# Patient Record
Sex: FEMALE | Race: WHITE | NOT HISPANIC OR LATINO | Employment: STUDENT | URBAN - METROPOLITAN AREA
[De-identification: names, ages, dates, MRNs, and addresses within clinical notes are randomized per-mention and may not be internally consistent; named-entity substitution may affect disease eponyms.]

---
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
## 2024-02-15 ENCOUNTER — HOSPITAL ENCOUNTER (EMERGENCY)
Facility: HOSPITAL | Age: 19
Discharge: HOME | End: 2024-02-15
Attending: EMERGENCY MEDICINE
Payer: COMMERCIAL

## 2024-02-15 VITALS
WEIGHT: 225 LBS | HEART RATE: 83 BPM | SYSTOLIC BLOOD PRESSURE: 121 MMHG | RESPIRATION RATE: 16 BRPM | HEIGHT: 64 IN | DIASTOLIC BLOOD PRESSURE: 87 MMHG | OXYGEN SATURATION: 98 % | BODY MASS INDEX: 38.41 KG/M2 | TEMPERATURE: 98 F

## 2024-02-15 DIAGNOSIS — S61.012A LACERATION OF LEFT THUMB WITHOUT FOREIGN BODY WITHOUT DAMAGE TO NAIL, INITIAL ENCOUNTER: Primary | ICD-10-CM

## 2024-02-15 PROCEDURE — 99282 EMERGENCY DEPT VISIT SF MDM: CPT

## 2024-02-15 PROCEDURE — 90715 TDAP VACCINE 7 YRS/> IM: CPT | Performed by: PHYSICIAN ASSISTANT

## 2024-02-15 PROCEDURE — 25010000002 TETANUS-DIPHTH-ACELL PERTUSSIS 5-2.5-18.5 LF-MCG/0.5 SUSPENSION PREFILLED SYRINGE: Performed by: PHYSICIAN ASSISTANT

## 2024-02-15 PROCEDURE — 90471 IMMUNIZATION ADMIN: CPT | Performed by: PHYSICIAN ASSISTANT

## 2024-02-15 RX ADMIN — TETANUS TOXOID, REDUCED DIPHTHERIA TOXOID AND ACELLULAR PERTUSSIS VACCINE, ADSORBED 0.5 ML: 5; 2.5; 8; 8; 2.5 SUSPENSION INTRAMUSCULAR at 12:58

## 2024-02-15 NOTE — ED PROVIDER NOTES
Subjective   History of Present Illness  Pt is a 17 yo female presenting to ED with complaints of finger laceration. PMHx significant for Anxiety.  Pt reports accidentally lacerating left thumb with a knife at work just [employer]. Small flap laceration that does not involve the nail. Bleeding controlled. No other injuries. No numbness or weakness. No decrease in ROM. No other complaints. Unsure last Tdap. Denies tobacco, drug or ETOH use.    History provided by:  Patient, medical records and relative      Review of Systems   Skin:  Positive for wound.   Neurological:  Negative for weakness and numbness.       Past Medical History:   Diagnosis Date    Allergic rhinitis due to cats     Anxiety        Allergies   Allergen Reactions    Delsym [Dextromethorphan] Rash    Lexapro [Escitalopram] Rash       Past Surgical History:   Procedure Laterality Date    CYST REMOVAL Left     left arm/wrist       No family history on file.    Social History     Socioeconomic History    Marital status: Single   Tobacco Use    Smoking status: Never     Passive exposure: Yes    Smokeless tobacco: Never   Vaping Use    Vaping Use: Never used   Substance and Sexual Activity    Alcohol use: Yes    Drug use: Never    Sexual activity: Defer           Objective   Physical Exam  Vitals and nursing note reviewed.   Constitutional:       General: She is not in acute distress.  HENT:      Head: Atraumatic.   Eyes:      Extraocular Movements: Extraocular movements intact.      Conjunctiva/sclera: Conjunctivae normal.   Cardiovascular:      Rate and Rhythm: Normal rate.      Pulses: Normal pulses.   Pulmonary:      Effort: Pulmonary effort is normal. No respiratory distress.   Musculoskeletal:         General: Normal range of motion.      Cervical back: Normal range of motion and neck supple.   Skin:     General: Skin is warm.      Findings: Laceration (left thumb, tip next to nail, 1 cm flap still in place, bleeding controlled, non gapping) present.    Neurological:      General: No focal deficit present.      Mental Status: She is alert.   Psychiatric:         Mood and Affect: Mood normal.         Behavior: Behavior normal.         Laceration Repair    Date/Time: 2/15/2024 1:07 PM    Performed by: Maite Keita PA  Authorized by: Francesco Pastrana MD    Consent:     Consent obtained:  Verbal    Consent given by:  Patient    Risks, benefits, and alternatives were discussed: yes      Risks discussed:  Infection, pain, tendon damage, vascular damage, poor cosmetic result and poor wound healing    Alternatives discussed:  No treatment  Universal protocol:     Patient identity confirmed:  Verbally with patient  Anesthesia:     Anesthesia method:  None  Laceration details:     Location:  Finger    Finger location:  L thumb    Length (cm):  1  Pre-procedure details:     Preparation:  Patient was prepped and draped in usual sterile fashion  Exploration:     Contaminated: no    Treatment:     Area cleansed with:  Saline and chlorhexidine    Amount of cleaning:  Standard  Skin repair:     Repair method:  Steri-Strips and tissue adhesive    Number of Steri-Strips:  2  Approximation:     Approximation:  Close  Repair type:     Repair type:  Simple  Post-procedure details:     Dressing:  Non-adherent dressing    Procedure completion:  Tolerated well, no immediate complications             ED Course      No results found for this or any previous visit (from the past 24 hour(s)).  Note: In addition to lab results from this visit, the labs listed above may include labs taken at another facility or during a different encounter within the last 24 hours. Please correlate lab times with ED admission and discharge times for further clarification of the services performed during this visit.    No orders to display     Vitals:    02/15/24 1231   BP: 121/87   BP Location: Left arm   Patient Position: Sitting   Pulse: 83   Resp: 16   Temp: 98 °F (36.7 °C)   TempSrc: Oral   SpO2:  "98%   Weight: 102 kg (225 lb)   Height: 162.6 cm (64\")     Medications   Tetanus-Diphth-Acell Pertussis (BOOSTRIX) injection 0.5 mL (0.5 mL Intramuscular Given 2/15/24 1258)     ECG/EMG Results (last 24 hours)       ** No results found for the last 24 hours. **          No orders to display       DISCHARGE    Patient discharged in stable condition.    Reviewed implications of results, diagnosis, meds, responsibility to follow up, warning signs and symptoms of possible worsening, potential complications and reasons to return to ER.    Patient/Family voiced understanding of above instructions.    Discussed plan for discharge, as there is no emergent indication for admission.  Pt/family is agreeable and understands need for follow up and possible repeat testing.  Pt/family is aware that discharge does not mean that nothing is wrong but that it indicates no emergency is currently present that requires admission and they must continue care with follow-up as given below or with a physician of their choice.     FOLLOW-UP  Danny Rutledge DO  [address] [address]  [phone]      As needed    [facility] EMERGENCY DEPARTMENT  [address] [facility] [id number] [phone]    If symptoms worsen         Medication List      No changes were made to your prescriptions during this visit.                                              Medical Decision Making  Pt is a 19 yo female presenting to ED with complaints of left thumb laceration. No need for further repair other than tissue glue and steri strips. Cleaned wound. Tdap given. Went over would care with patient.     DDx  Laceration, Foreign body, Tendon injury, Fracture, Arterial bleed, Skin avulsion     Problems Addressed:  Laceration of left thumb without foreign body without damage to nail, initial encounter: acute illness or injury    Amount and/or Complexity of Data Reviewed  External Data Reviewed: notes.     " Details: Reviewed previous non ED visits including prior labs, imaging, available notes, medications, allergies and surgical hx.           Final diagnoses:   Laceration of left thumb without foreign body without damage to nail, initial encounter       ED Disposition  ED Disposition       ED Disposition   Discharge    Condition   Stable    Comment   --               Danny Rutledge DO  [address] [facility] [address]  [phone]      As needed    [facility] EMERGENCY DEPARTMENT  [address] [facility] [address] [phone]    If symptoms worsen         Medication List      No changes were made to your prescriptions during this visit.            Maite Keita PA  02/15/24 8077

## 2024-02-15 NOTE — Clinical Note
[facility] EMERGENCY DEPARTMENT  [address]  [facility] [address]  Phone: [phone]    Gorge Canela was seen and treated in our emergency department on 2/15/2024.  She may return to work on 02/16/2024.         Thank you for choosing [facility].    Chelsea Baig RN

## 2024-02-21 RX ORDER — MONTELUKAST SODIUM 10 MG/1
10 TABLET ORAL NIGHTLY
Qty: 90 TABLET | Refills: 0 | Status: SHIPPED | OUTPATIENT
Start: 2024-02-21

## 2024-03-19 ENCOUNTER — TELEMEDICINE (OUTPATIENT)
Dept: FAMILY MEDICINE CLINIC | Facility: TELEHEALTH | Age: 19
End: 2024-03-19
Payer: COMMERCIAL

## 2024-03-19 DIAGNOSIS — L02.429 BOIL, LEG: Primary | ICD-10-CM

## 2024-03-19 RX ORDER — SULFAMETHOXAZOLE AND TRIMETHOPRIM 800; 160 MG/1; MG/1
1 TABLET ORAL 2 TIMES DAILY
Qty: 20 TABLET | Refills: 0 | Status: SHIPPED | OUTPATIENT
Start: 2024-03-19 | End: 2024-03-29

## 2024-03-19 NOTE — LETTER
[facility]  [facility]  [address]  [address]  [facility] [address]  Phone: [phone]  Fax: [phone]    Gorge Canela was seen and treated in our Urgent Care on 3/19/2024.  She may return to work on 3/21/24.       .        Thank you for choosing [facility].      KRYTSAL Arauz

## 2024-03-19 NOTE — PROGRESS NOTES
You have chosen to receive care through a telehealth visit.  Do you consent to use a video/audio connection for your medical care today? Yes     CHIEF COMPLAINT  Chief Complaint   Patient presents with    skin infection         HPI  Gorge Canela is a 18 y.o. female  presents with complaint of a knot to bilateral inner thighs that appeared yesterday.  She states that it is painful to walk    Review of Systems   Skin:         Boils to bilateral inner thighs   All other systems reviewed and are negative.      Past Medical History:   Diagnosis Date    Allergic rhinitis due to cats     Anxiety        History reviewed. No pertinent family history.    Social History     Socioeconomic History    Marital status: Single   Tobacco Use    Smoking status: Never     Passive exposure: Yes    Smokeless tobacco: Never   Vaping Use    Vaping status: Never Used   Substance and Sexual Activity    Alcohol use: Yes    Drug use: Never    Sexual activity: Defer       Gorge Canela  reports that she has never smoked. She has been exposed to tobacco smoke. She has never used smokeless tobacco.           There were no vitals taken for this visit.    PHYSICAL EXAM  Physical Exam   Constitutional: She appears well-developed and well-nourished.   HENT:   Head: Normocephalic.   Eyes: Pupils are equal, round, and reactive to light.   Pulmonary/Chest: Effort normal.   Musculoskeletal: Normal range of motion.   Neurological: She is alert.   Skin: There is erythema (boils to bilateral inner thighs).   Psychiatric: She has a normal mood and affect.       Results for orders placed or performed during the hospital encounter of 11/20/23   Comprehensive Metabolic Panel    Specimen: Blood   Result Value Ref Range    Glucose 85 65 - 99 mg/dL    BUN 6 6 - 20 mg/dL    Creatinine 0.61 0.57 - 1.00 mg/dL    Sodium 140 136 - 145 mmol/L    Potassium 4.4 3.5 - 5.2 mmol/L    Chloride 104 98 - 107 mmol/L    CO2 25.0 22.0 - 29.0 mmol/L    Calcium 9.7 8.6 - 10.5  mg/dL    Total Protein 7.1 6.0 - 8.5 g/dL    Albumin 4.4 3.5 - 5.2 g/dL    ALT (SGPT) 13 1 - 33 U/L    AST (SGOT) 15 1 - 32 U/L    Alkaline Phosphatase 149 (H) 39 - 117 U/L    Total Bilirubin 0.5 0.0 - 1.2 mg/dL    Globulin 2.7 gm/dL    A/G Ratio 1.6 g/dL    BUN/Creatinine Ratio 9.8 7.0 - 25.0    Anion Gap 11.0 5.0 - 15.0 mmol/L    eGFR 132.3 >60.0 mL/min/1.73   Lipase    Specimen: Blood   Result Value Ref Range    Lipase 21 13 - 60 U/L   Urinalysis With Microscopic If Indicated (No Culture) - Urine, Clean Catch    Specimen: Urine, Clean Catch   Result Value Ref Range    Color, UA Yellow Yellow, Straw    Appearance, UA Clear Clear    pH, UA 7.0 5.0 - 8.0    Specific Gravity, UA 1.014 1.001 - 1.030    Glucose, UA Negative Negative    Ketones, UA Negative Negative    Bilirubin, UA Negative Negative    Blood, UA Negative Negative    Protein, UA Negative Negative    Leuk Esterase, UA Negative Negative    Nitrite, UA Negative Negative    Urobilinogen, UA 0.2 E.U./dL 0.2 - 1.0 E.U./dL   CBC Auto Differential    Specimen: Blood   Result Value Ref Range    WBC 9.17 3.40 - 10.80 10*3/mm3    RBC 4.80 3.77 - 5.28 10*6/mm3    Hemoglobin 13.7 12.0 - 15.9 g/dL    Hematocrit 42.0 34.0 - 46.6 %    MCV 87.5 79.0 - 97.0 fL    MCH 28.5 26.6 - 33.0 pg    MCHC 32.6 31.5 - 35.7 g/dL    RDW 12.6 12.3 - 15.4 %    RDW-SD 40.5 37.0 - 54.0 fl    MPV 11.2 6.0 - 12.0 fL    Platelets 383 140 - 450 10*3/mm3    Neutrophil % 62.9 42.7 - 76.0 %    Lymphocyte % 26.7 19.6 - 45.3 %    Monocyte % 7.4 5.0 - 12.0 %    Eosinophil % 2.1 0.3 - 6.2 %    Basophil % 0.5 0.0 - 1.5 %    Immature Grans % 0.4 0.0 - 0.5 %    Neutrophils, Absolute 5.76 1.70 - 7.00 10*3/mm3    Lymphocytes, Absolute 2.45 0.70 - 3.10 10*3/mm3    Monocytes, Absolute 0.68 0.10 - 0.90 10*3/mm3    Eosinophils, Absolute 0.19 0.00 - 0.40 10*3/mm3    Basophils, Absolute 0.05 0.00 - 0.20 10*3/mm3    Immature Grans, Absolute 0.04 0.00 - 0.05 10*3/mm3    nRBC 0.0 0.0 - 0.2 /100 WBC   POC Urine  Pregnancy    Specimen: Urine   Result Value Ref Range    HCG, Urine, QL Negative Negative    Lot Number [id number]     Internal Positive Control Passed Positive, Passed    Internal Negative Control Passed Negative, Passed    Expiration Date 01/30/2025    Lavender Top   Result Value Ref Range    Extra Tube hold for add-on    Gold Top - SST   Result Value Ref Range    Extra Tube Hold for add-ons.    Gray Top   Result Value Ref Range    Extra Tube Hold for add-ons.    Light Blue Top   Result Value Ref Range    Extra Tube Hold for add-ons.        Diagnoses and all orders for this visit:    1. Boil, leg (Primary)  -     sulfamethoxazole-trimethoprim (Bactrim DS) 800-160 MG per tablet; Take 1 tablet by mouth 2 (Two) Times a Day for 10 days.  Dispense: 20 tablet; Refill: 0          FOLLOW-UP  As discussed during visit with PCP/[facility] if no improvement or Urgent Care/Emergency Department if worsening of symptoms    Patient verbalizes understanding of medication dosage, comfort measures, instructions for treatment and follow-up.    Sandra Lopez, APRN  03/19/2024  07:10 EDT    The use of a video visit has been reviewed with the patient and verbal informed consent has been obtained. Myself and Gorge Canela participated in this visit. The patient is located in [address] [place] [address].    I am located in [place], [place]. [employer] and [employer] were utilized. I spent 10 minutes in the patient's chart for this visit.      Note Disclaimer: At [facility], we believe that sharing information builds trust and better   relationships. You are receiving this note because you recently visited [facility]. It is possible you   will see health information before a provider has talked with you about it. This kind of information can   be easy to misunderstand. To help you fully understand what it means for your health, we urge you to   discuss this note with your provider.

## 2024-03-21 ENCOUNTER — LAB (OUTPATIENT)
Dept: LAB | Facility: HOSPITAL | Age: 19
End: 2024-03-21
Payer: COMMERCIAL

## 2024-03-21 ENCOUNTER — OFFICE VISIT (OUTPATIENT)
Dept: FAMILY MEDICINE CLINIC | Facility: CLINIC | Age: 19
End: 2024-03-21
Payer: COMMERCIAL

## 2024-03-21 VITALS
WEIGHT: 241 LBS | DIASTOLIC BLOOD PRESSURE: 78 MMHG | BODY MASS INDEX: 41.15 KG/M2 | SYSTOLIC BLOOD PRESSURE: 122 MMHG | HEIGHT: 64 IN | HEART RATE: 97 BPM | OXYGEN SATURATION: 99 %

## 2024-03-21 DIAGNOSIS — F41.9 ANXIETY: ICD-10-CM

## 2024-03-21 DIAGNOSIS — L29.9 PRURITUS: ICD-10-CM

## 2024-03-21 DIAGNOSIS — L50.9 HIVES: Primary | ICD-10-CM

## 2024-03-21 DIAGNOSIS — R74.8 ELEVATED ALKALINE PHOSPHATASE LEVEL: ICD-10-CM

## 2024-03-21 LAB
BASOPHILS # BLD AUTO: 0.05 10*3/MM3 (ref 0–0.2)
BASOPHILS NFR BLD AUTO: 0.4 % (ref 0–1.5)
DEPRECATED RDW RBC AUTO: 41.9 FL (ref 37–54)
EOSINOPHIL # BLD AUTO: 0.14 10*3/MM3 (ref 0–0.4)
EOSINOPHIL NFR BLD AUTO: 1.2 % (ref 0.3–6.2)
ERYTHROCYTE [DISTWIDTH] IN BLOOD BY AUTOMATED COUNT: 13.1 % (ref 12.3–15.4)
HCT VFR BLD AUTO: 41.6 % (ref 34–46.6)
HGB BLD-MCNC: 13.9 G/DL (ref 12–15.9)
IMM GRANULOCYTES # BLD AUTO: 0.03 10*3/MM3 (ref 0–0.05)
IMM GRANULOCYTES NFR BLD AUTO: 0.3 % (ref 0–0.5)
LYMPHOCYTES # BLD AUTO: 1.87 10*3/MM3 (ref 0.7–3.1)
LYMPHOCYTES NFR BLD AUTO: 16.6 % (ref 19.6–45.3)
MCH RBC QN AUTO: 29.3 PG (ref 26.6–33)
MCHC RBC AUTO-ENTMCNC: 33.4 G/DL (ref 31.5–35.7)
MCV RBC AUTO: 87.6 FL (ref 79–97)
MONOCYTES # BLD AUTO: 0.93 10*3/MM3 (ref 0.1–0.9)
MONOCYTES NFR BLD AUTO: 8.3 % (ref 5–12)
NEUTROPHILS NFR BLD AUTO: 73.2 % (ref 42.7–76)
NEUTROPHILS NFR BLD AUTO: 8.24 10*3/MM3 (ref 1.7–7)
NRBC BLD AUTO-RTO: 0 /100 WBC (ref 0–0.2)
PLATELET # BLD AUTO: 365 10*3/MM3 (ref 140–450)
PMV BLD AUTO: 12.6 FL (ref 6–12)
RBC # BLD AUTO: 4.75 10*6/MM3 (ref 3.77–5.28)
WBC NRBC COR # BLD AUTO: 11.26 10*3/MM3 (ref 3.4–10.8)

## 2024-03-21 PROCEDURE — 99213 OFFICE O/P EST LOW 20 MIN: CPT | Performed by: FAMILY MEDICINE

## 2024-03-21 PROCEDURE — 84080 ASSAY ALKALINE PHOSPHATASES: CPT | Performed by: FAMILY MEDICINE

## 2024-03-21 PROCEDURE — 82306 VITAMIN D 25 HYDROXY: CPT | Performed by: FAMILY MEDICINE

## 2024-03-21 PROCEDURE — 84075 ASSAY ALKALINE PHOSPHATASE: CPT | Performed by: FAMILY MEDICINE

## 2024-03-21 PROCEDURE — 85025 COMPLETE CBC W/AUTO DIFF WBC: CPT | Performed by: FAMILY MEDICINE

## 2024-03-21 PROCEDURE — 82785 ASSAY OF IGE: CPT | Performed by: FAMILY MEDICINE

## 2024-03-21 NOTE — PROGRESS NOTES
Established Patient Office Visit      Patient Name: Gorge Canela  : 2005   MRN: [id number]   Care Team: Patient Care Team:  Danny Rutledge DO as PCP - General (Family Medicine)    Chief Complaint:    Chief Complaint   Patient presents with    iching     All over body    Cyst     In groin area         History of Present Illness: Gorge Canela is a 18 y.o. female who is here today for chief complaint.    HPI    Only taking hydroxyzine 3 times per week. Taking Singulair daily for allergies d/t cats  Allergist gave her cream and told her to take for 14 days, no change. No longer having bumps, just itchy everywhere (except face)  Recently stopped taking fluoxetine she states she didn't feel it was helping. Stopped cold turkey. Taking trazodone at night for sleep.    This patient is accompanied by their self who contributes to the history of their care.    The following portions of the patient's history were reviewed and updated as appropriate: allergies, current medications, past family history, past medical history, past social history, past surgical history and problem list.    Subjective      Review of Systems:   Review of Systems - See HPI    Past Medical History:   Past Medical History:   Diagnosis Date    Allergic rhinitis due to cats     Anxiety        Past Surgical History:   Past Surgical History:   Procedure Laterality Date    CYST REMOVAL Left     left arm/wrist       Family History: History reviewed. No pertinent family history.    Social History:   Social History     Socioeconomic History    Marital status: Single   Tobacco Use    Smoking status: Never     Passive exposure: Yes    Smokeless tobacco: Never   Vaping Use    Vaping status: Never Used   Substance and Sexual Activity    Alcohol use: Yes    Drug use: Never    Sexual activity: Defer       Tobacco History:   Social History     Tobacco Use   Smoking Status Never    Passive exposure: Yes   Smokeless Tobacco Never       Medications:  "    Current Outpatient Medications:     hydrOXYzine pamoate (VISTARIL) 50 MG capsule, Take 1 capsule by mouth 2 (Two) Times a Day., Disp: , Rfl:     montelukast (SINGULAIR) 10 MG tablet, TAKE ONE TABLET BY MOUTH ONCE NIGHTLY, Disp: 90 tablet, Rfl: 0    traZODone (DESYREL) 50 MG tablet, Take 1 tablet by mouth Every Night., Disp: , Rfl:     colestipol (COLESTID) 1 g tablet, Take 1 tablet by mouth 2 (Two) Times a Day., Disp: 60 tablet, Rfl: 0    levocetirizine (XYZAL) 5 MG tablet, Take 1 tablet by mouth Every Evening., Disp: 90 tablet, Rfl: 3    Allergies:   Allergies   Allergen Reactions    Delsym [Dextromethorphan] Rash    Lexapro [Escitalopram] Rash       Objective   Objective     Physical Exam:  Vital Signs:   Vitals:    03/21/24 1108   BP: 122/78   Pulse: 97   SpO2: 99%   Weight: 109 kg (241 lb)   Height: 162.6 cm (64.02\")     Body mass index is 41.35 kg/m².     Physical Exam  Nursing note reviewed  Const: NAD, A&Ox4, Pleasant, Cooperative  Eyes: EOMI, no conjunctivitis  ENT: No nasal discharge present, neck supple  Cardiac: Regular rate and rhythm, no cyanosis  Resp: Respiratory rate within normal limits, no increased work of breathing, no audible wheezing or retractions noted  GI: No distention or ascites  MSK: Motor and sensation grossly intact in bilateral upper extremities  Neurologic: CN II-XII grossly intact  Psych: Appropriate mood and behavior.  Skin: Warm, dry  Procedures/Radiology     Procedures  No radiology results for the last 7 days     Assessment & Plan   Assessment / Plan      Assessment/Plan:   Problems Addressed This Visit  Diagnoses and all orders for this visit:    1. Hives (Primary)    2. Pruritus  -     CBC & Differential; Future  -     IgE; Future  -     CBC & Differential  -     IgE    3. Elevated alkaline phosphatase level  Comments:  Normal CT abd NOv 2023  Orders:  -     Alkaline Phosphatase, Isoenzymes; Future  -     Vitamin D,25-Hydroxy; Future  -     Alkaline Phosphatase, " Isoenzymes  -     Vitamin D,25-Hydroxy    4. Anxiety      Problem List Items Addressed This Visit          Mental Health    Anxiety    Overview     Cannot tolerate escitalopram, failed sertraline, fluoxetine          Other Visit Diagnoses       Hives    -  Primary    Pruritus        Relevant Orders    CBC & Differential (Completed)    IgE (Completed)    Elevated alkaline phosphatase level        Normal CT abd NOv 2023    Relevant Orders    Alkaline Phosphatase, Isoenzymes (Completed)    Vitamin D,25-Hydroxy (Completed)              There are no Patient Instructions on file for this visit.    Follow Up:   No follow-ups on file.      DO CHARLY Littlejohn RD  [facility]IMARY [facility]ARE  [address]  [facility] [address]  Fax [phone]  Phone [phone]

## 2024-03-22 LAB — 25(OH)D3 SERPL-MCNC: 11.8 NG/ML (ref 30–100)

## 2024-03-25 LAB
ALP BONE CFR SERPL: 30 % (ref 14–68)
ALP INTEST CFR SERPL: 9 % (ref 0–18)
ALP LIVER CFR SERPL: 61 % (ref 18–85)
ALP SERPL-CCNC: 140 IU/L (ref 42–106)

## 2024-03-28 ENCOUNTER — TELEPHONE (OUTPATIENT)
Dept: FAMILY MEDICINE CLINIC | Facility: CLINIC | Age: 19
End: 2024-03-28
Payer: COMMERCIAL

## 2024-03-28 NOTE — TELEPHONE ENCOUNTER
----- Message from Shaila Todd MA sent at 3/28/2024  9:57 AM EDT -----  Regarding: FW: Allergist  Contact: [phone]  It looks a referral was placed back in November can we check on this?  ----- Message -----  From: Gorge Canela  Sent: 3/28/2024   9:45 AM EDT  To: [address] [facility]  Subject: Allergist                                        I don’t know if Eliana has already asked you or not but can you please refer her to an allergist. When I call I’m told I need a referral. Thank you.  Patito            SPOKE WITH PATIENTS MOTHER, RESENT REFERRAL TO [facility]

## 2024-03-30 LAB — IGE SERPL-ACNC: 281 IU/ML (ref 6–495)

## 2024-04-07 ENCOUNTER — HOSPITAL ENCOUNTER (EMERGENCY)
Facility: HOSPITAL | Age: 19
Discharge: HOME | End: 2024-04-07
Attending: EMERGENCY MEDICINE | Admitting: EMERGENCY MEDICINE
Payer: COMMERCIAL

## 2024-04-07 VITALS
RESPIRATION RATE: 16 BRPM | HEART RATE: 63 BPM | WEIGHT: 225 LBS | SYSTOLIC BLOOD PRESSURE: 109 MMHG | BODY MASS INDEX: 38.41 KG/M2 | OXYGEN SATURATION: 96 % | HEIGHT: 64 IN | DIASTOLIC BLOOD PRESSURE: 97 MMHG | TEMPERATURE: 98.2 F

## 2024-04-07 DIAGNOSIS — R09.1 PLEURISY: Primary | ICD-10-CM

## 2024-04-07 LAB
ALBUMIN SERPL-MCNC: 4.5 G/DL (ref 3.5–5.2)
ALBUMIN/GLOB SERPL: 1.5 G/DL
ALP SERPL-CCNC: 148 U/L (ref 39–117)
ALT SERPL W P-5'-P-CCNC: 11 U/L (ref 1–33)
ANION GAP SERPL CALCULATED.3IONS-SCNC: 8 MMOL/L (ref 5–15)
AST SERPL-CCNC: 12 U/L (ref 1–32)
B-HCG UR QL: NEGATIVE
BASOPHILS # BLD AUTO: 0.04 10*3/MM3 (ref 0–0.2)
BASOPHILS NFR BLD AUTO: 0.4 % (ref 0–1.5)
BILIRUB SERPL-MCNC: 0.3 MG/DL (ref 0–1.2)
BILIRUB UR QL STRIP: NEGATIVE
BUN SERPL-MCNC: 12 MG/DL (ref 6–20)
BUN/CREAT SERPL: 21.1 (ref 7–25)
CALCIUM SPEC-SCNC: 9.5 MG/DL (ref 8.6–10.5)
CHLORIDE SERPL-SCNC: 106 MMOL/L (ref 98–107)
CLARITY UR: CLEAR
CO2 SERPL-SCNC: 26 MMOL/L (ref 22–29)
COLOR UR: YELLOW
CREAT SERPL-MCNC: 0.57 MG/DL (ref 0.57–1)
D DIMER PPP FEU-MCNC: <0.27 MCGFEU/ML (ref 0–0.5)
DEPRECATED RDW RBC AUTO: 40.6 FL (ref 37–54)
EGFRCR SERPLBLD CKD-EPI 2021: 134.4 ML/MIN/1.73
EOSINOPHIL # BLD AUTO: 0.13 10*3/MM3 (ref 0–0.4)
EOSINOPHIL NFR BLD AUTO: 1.3 % (ref 0.3–6.2)
ERYTHROCYTE [DISTWIDTH] IN BLOOD BY AUTOMATED COUNT: 12.7 % (ref 12.3–15.4)
EXPIRATION DATE: NORMAL
GLOBULIN UR ELPH-MCNC: 3.1 GM/DL
GLUCOSE SERPL-MCNC: 89 MG/DL (ref 65–99)
GLUCOSE UR STRIP-MCNC: NEGATIVE MG/DL
HCT VFR BLD AUTO: 41.4 % (ref 34–46.6)
HGB BLD-MCNC: 13.5 G/DL (ref 12–15.9)
HGB UR QL STRIP.AUTO: NEGATIVE
IMM GRANULOCYTES # BLD AUTO: 0.02 10*3/MM3 (ref 0–0.05)
IMM GRANULOCYTES NFR BLD AUTO: 0.2 % (ref 0–0.5)
INTERNAL NEGATIVE CONTROL: NEGATIVE
INTERNAL POSITIVE CONTROL: POSITIVE
KETONES UR QL STRIP: NEGATIVE
LEUKOCYTE ESTERASE UR QL STRIP.AUTO: NEGATIVE
LYMPHOCYTES # BLD AUTO: 2.44 10*3/MM3 (ref 0.7–3.1)
LYMPHOCYTES NFR BLD AUTO: 25.1 % (ref 19.6–45.3)
Lab: NORMAL
MCH RBC QN AUTO: 28.4 PG (ref 26.6–33)
MCHC RBC AUTO-ENTMCNC: 32.6 G/DL (ref 31.5–35.7)
MCV RBC AUTO: 87 FL (ref 79–97)
MONOCYTES # BLD AUTO: 0.73 10*3/MM3 (ref 0.1–0.9)
MONOCYTES NFR BLD AUTO: 7.5 % (ref 5–12)
NEUTROPHILS NFR BLD AUTO: 6.35 10*3/MM3 (ref 1.7–7)
NEUTROPHILS NFR BLD AUTO: 65.5 % (ref 42.7–76)
NITRITE UR QL STRIP: NEGATIVE
NRBC BLD AUTO-RTO: 0 /100 WBC (ref 0–0.2)
PH UR STRIP.AUTO: 7 [PH] (ref 5–8)
PLATELET # BLD AUTO: 381 10*3/MM3 (ref 140–450)
PMV BLD AUTO: 10.7 FL (ref 6–12)
POTASSIUM SERPL-SCNC: 4.4 MMOL/L (ref 3.5–5.2)
PROT SERPL-MCNC: 7.6 G/DL (ref 6–8.5)
PROT UR QL STRIP: NEGATIVE
QT INTERVAL: 396 MS
QTC INTERVAL: 405 MS
RBC # BLD AUTO: 4.76 10*6/MM3 (ref 3.77–5.28)
SODIUM SERPL-SCNC: 140 MMOL/L (ref 136–145)
SP GR UR STRIP: 1.02 (ref 1–1.03)
TROPONIN T SERPL HS-MCNC: <6 NG/L
UROBILINOGEN UR QL STRIP: NORMAL
WBC NRBC COR # BLD AUTO: 9.71 10*3/MM3 (ref 3.4–10.8)

## 2024-04-07 PROCEDURE — 81025 URINE PREGNANCY TEST: CPT | Performed by: EMERGENCY MEDICINE

## 2024-04-07 PROCEDURE — 99283 EMERGENCY DEPT VISIT LOW MDM: CPT

## 2024-04-07 PROCEDURE — 96374 THER/PROPH/DIAG INJ IV PUSH: CPT

## 2024-04-07 PROCEDURE — 96361 HYDRATE IV INFUSION ADD-ON: CPT

## 2024-04-07 PROCEDURE — 80053 COMPREHEN METABOLIC PANEL: CPT | Performed by: EMERGENCY MEDICINE

## 2024-04-07 PROCEDURE — 25010000002 KETOROLAC TROMETHAMINE PER 15 MG: Performed by: EMERGENCY MEDICINE

## 2024-04-07 PROCEDURE — 85379 FIBRIN DEGRADATION QUANT: CPT | Performed by: EMERGENCY MEDICINE

## 2024-04-07 PROCEDURE — 25810000003 SODIUM CHLORIDE 0.9 % SOLUTION: Performed by: EMERGENCY MEDICINE

## 2024-04-07 PROCEDURE — 93005 ELECTROCARDIOGRAM TRACING: CPT | Performed by: EMERGENCY MEDICINE

## 2024-04-07 PROCEDURE — 81003 URINALYSIS AUTO W/O SCOPE: CPT | Performed by: EMERGENCY MEDICINE

## 2024-04-07 PROCEDURE — 84484 ASSAY OF TROPONIN QUANT: CPT | Performed by: EMERGENCY MEDICINE

## 2024-04-07 PROCEDURE — 85025 COMPLETE CBC W/AUTO DIFF WBC: CPT | Performed by: EMERGENCY MEDICINE

## 2024-04-07 RX ORDER — NAPROXEN 500 MG/1
500 TABLET ORAL 2 TIMES DAILY WITH MEALS
Qty: 20 TABLET | Refills: 0 | Status: SHIPPED | OUTPATIENT
Start: 2024-04-07

## 2024-04-07 RX ORDER — SODIUM CHLORIDE 0.9 % (FLUSH) 0.9 %
10 SYRINGE (ML) INJECTION AS NEEDED
Status: DISCONTINUED | OUTPATIENT
Start: 2024-04-07 | End: 2024-04-07 | Stop reason: HOSPADM

## 2024-04-07 RX ORDER — ONDANSETRON 2 MG/ML
4 INJECTION INTRAMUSCULAR; INTRAVENOUS
Status: DISCONTINUED | OUTPATIENT
Start: 2024-04-07 | End: 2024-04-07 | Stop reason: HOSPADM

## 2024-04-07 RX ORDER — KETOROLAC TROMETHAMINE 30 MG/ML
30 INJECTION, SOLUTION INTRAMUSCULAR; INTRAVENOUS ONCE
Status: COMPLETED | OUTPATIENT
Start: 2024-04-07 | End: 2024-04-07

## 2024-04-07 RX ADMIN — SODIUM CHLORIDE 1000 ML: 9 INJECTION, SOLUTION INTRAVENOUS at 14:45

## 2024-04-07 RX ADMIN — KETOROLAC TROMETHAMINE 30 MG: 30 INJECTION, SOLUTION INTRAMUSCULAR; INTRAVENOUS at 14:44

## 2024-04-07 NOTE — ED PROVIDER NOTES
[facility]    EMERGENCY DEPARTMENT ENCOUNTER      Pt Name: Gorge Canela  MRN: [id number]  YOB: 2005  Date of evaluation: 4/7/2024  Provider: Charlie Becker DO    CHIEF COMPLAINT       Chief Complaint   Patient presents with    Shortness of Breath     HPI  Stated Reason for Visit: PT ARRIVES FOR SOA X 3 DAYS. SENT FROM [facility]. History Obtained From: patient     HISTORY OF PRESENT ILLNESS  (Location/Symptom, Timing/Onset, Context/Setting, Quality, Duration, Modifying Factors, Severity.)   Gorge Canela is a 18 y.o. female who presents to the emergency department referred over from urgent treatment center secondary to 3 days of shortness of breath intermittent chest pain worse with deep inspiration, very mild headache.  She notes some intermittent nausea and vomiting as well as dry heaving.  Remote history of asthma, she does vape, does not smoke cigarettes.  Has a history of recurrent allergies and is following with an allergy specialist.  She had a chest x-ray, COVID/flu test done in urgent treatment center which was unremarkable and sent here for further assessment.  No history of blood clotting disorders, denies any recent illness, no fevers or chills, denies any sick contacts, recent travel.  Denies any other acute systemic complaints at this time.    Nursing notes were reviewed.      PAST MEDICAL HISTORY     Past Medical History:   Diagnosis Date    Allergic rhinitis due to cats     Anxiety          SURGICAL HISTORY       Past Surgical History:   Procedure Laterality Date    CYST REMOVAL Left     left arm/wrist         CURRENT MEDICATIONS       Current Facility-Administered Medications:     ondansetron (ZOFRAN) injection 4 mg, 4 mg, Intravenous, Q30 Min PRN, Charlie Becker DO    sodium chloride 0.9 % bolus 1,000 mL, 1,000 mL, Intravenous, Once, Charlie Becker DO, Last Rate: 1,000 mL/hr at 04/07/24 1445, 1,000 mL at 04/07/24 1445    [COMPLETED] Insert Peripheral IV, , , Once  "**AND** sodium chloride 0.9 % flush 10 mL, 10 mL, Intravenous, PRN, Charlie Becker, DO    Current Outpatient Medications:     colestipol (COLESTID) 1 g tablet, Take 1 tablet by mouth 2 (Two) Times a Day., Disp: 60 tablet, Rfl: 0    hydrOXYzine pamoate (VISTARIL) 50 MG capsule, Take 1 capsule by mouth 2 (Two) Times a Day., Disp: , Rfl:     levocetirizine (XYZAL) 5 MG tablet, Take 1 tablet by mouth Every Evening., Disp: 90 tablet, Rfl: 3    montelukast (SINGULAIR) 10 MG tablet, TAKE ONE TABLET BY MOUTH ONCE NIGHTLY, Disp: 90 tablet, Rfl: 0    naproxen (Naprosyn) 500 MG tablet, Take 1 tablet by mouth 2 (Two) Times a Day With Meals., Disp: 20 tablet, Rfl: 0    traZODone (DESYREL) 50 MG tablet, Take 1 tablet by mouth Every Night., Disp: , Rfl:     Vortioxetine HBr (Trintellix) 10 MG tablet tablet, Take 1 tablet by mouth Daily With Breakfast., Disp: , Rfl:     ALLERGIES     Delsym [dextromethorphan] and Lexapro [escitalopram]    FAMILY HISTORY     History reviewed. No pertinent family history.       SOCIAL HISTORY       Social History     Socioeconomic History    Marital status: Single   Tobacco Use    Smoking status: Never     Passive exposure: Yes    Smokeless tobacco: Never   Vaping Use    Vaping status: Never Used   Substance and Sexual Activity    Alcohol use: Yes    Drug use: Never    Sexual activity: Defer         PHYSICAL EXAM    (up to 7 for level 4, 8 or more for level 5)     Vitals:    04/07/24 1402 04/07/24 1451 04/07/24 1501   BP: 138/72 122/75 109/97   BP Location: Left arm     Patient Position: Sitting     Pulse: 83 71 63   Resp: 16     Temp: 98.2 °F (36.8 °C)     TempSrc: Oral     SpO2: 97% 96% 96%   Weight: 102 kg (225 lb)     Height: 162.6 cm (64\")         Physical Exam  General : Patient is awake, alert, oriented, in no acute distress, nontoxic appearing  HEENT: Pupils are equally round, EOMI, conjunctivae clear, there is no injection no icterus.  Oral mucosa is moist, uvula midline  Neck: Neck " is supple, full range of motion, trachea midline  Cardiac: Heart regular rate, rhythm, no murmurs, rubs, or gallops  Lungs: Lungs are clear to auscultation, there is no wheezing, rhonchi, or rales. There is no use of accessory muscles  Chest wall: There is no tenderness to palpation over the chest wall or over ribs  Abdomen: Abdomen is soft, nontender, nondistended. There are no firm or pulsatile masses, no rebound rigidity or guarding  Musculoskeletal: 5 out of 5 strength in all 4 extremities.  No focal muscle deficits are appreciated  Neuro: Motor intact, sensory intact, level of consciousness is normal  Dermatology: Skin is warm and dry  Psych: Mentation is grossly normal, cognition is grossly normal. Affect is appropriate      DIAGNOSTIC RESULTS     EKG:  All EKGs are interpreted by the Emergency Department Physician who either signs or Co-signs this chart in the absence of a cardiologist.    ECG 12 Lead Dyspnea   Final Result   Test Reason : Dyspnea   Blood Pressure :   */*   mmHG   Vent. Rate :  63 BPM     Atrial Rate :  63 BPM      P-R Int : 134 ms          QRS Dur :  74 ms       QT Int : 396 ms       P-R-T Axes :  40  16  14 degrees      QTc Int : 405 ms      Normal sinus rhythm with sinus arrhythmia   Nonspecific ST abnormality   Abnormal ECG   No previous ECGs available   Confirmed by RAHAT BARROS MD ([id number]) on 4/7/2024 2:51:17 PM      Referred By: EDMD           Confirmed By: RAHTA BARROS MD          RADIOLOGY:   Encounter Date    4/7/24    XR Chest 2 View [[id number]] (Order [id number])  Order  Status: Final result     Study Notes     Nadia Banks CMA on 4/7/2024  1:21 PM EDT   [facility]     Appointment Information    PACS Images     Radiology Images  Study Result    Narrative & Impression   XR CHEST 2 VW     Date of Exam: 4/7/2024 1:13 PM EDT     Indication: shortness of breath     Comparison: None available.     Findings: No focal consolidation. No pneumothorax or pleural effusion. Cardiac size is normal.  No displaced rib fractures. The clavicles are intact. The visualized upper abdomen is normal. The thoracic vertebral body height and alignment is normal. No   lytic or blastic bony diseases.     IMPRESSION:  Impression: No acute cardiopulmonary disease.        Electronically Signed: Heri Lau MD    4/7/2024 1:34 PM EDT    Workstation ID: [id number]     [x] Radiologist's Report Reviewed:  No orders to display         ED BEDSIDE ULTRASOUND:   Performed by ED Physician - none    LABS:    I have reviewed and interpreted all of the currently available lab results from this visit (if applicable):  Results for orders placed or performed during the hospital encounter of 04/07/24   Comprehensive Metabolic Panel    Specimen: Blood   Result Value Ref Range    Glucose 89 65 - 99 mg/dL    BUN 12 6 - 20 mg/dL    Creatinine 0.57 0.57 - 1.00 mg/dL    Sodium 140 136 - 145 mmol/L    Potassium 4.4 3.5 - 5.2 mmol/L    Chloride 106 98 - 107 mmol/L    CO2 26.0 22.0 - 29.0 mmol/L    Calcium 9.5 8.6 - 10.5 mg/dL    Total Protein 7.6 6.0 - 8.5 g/dL    Albumin 4.5 3.5 - 5.2 g/dL    ALT (SGPT) 11 1 - 33 U/L    AST (SGOT) 12 1 - 32 U/L    Alkaline Phosphatase 148 (H) 39 - 117 U/L    Total Bilirubin 0.3 0.0 - 1.2 mg/dL    Globulin 3.1 gm/dL    A/G Ratio 1.5 g/dL    BUN/Creatinine Ratio 21.1 7.0 - 25.0    Anion Gap 8.0 5.0 - 15.0 mmol/L    eGFR 134.4 >60.0 mL/min/1.73   Urinalysis With Microscopic If Indicated (No Culture) - Urine, Clean Catch    Specimen: Urine, Clean Catch   Result Value Ref Range    Color, UA Yellow Yellow, Straw    Appearance, UA Clear Clear    pH, UA 7.0 5.0 - 8.0    Specific Gravity, UA 1.020 1.001 - 1.030    Glucose, UA Negative Negative    Ketones, UA Negative Negative    Bilirubin, UA Negative Negative    Blood, UA Negative Negative    Protein, UA Negative Negative    Leuk Esterase, UA Negative Negative    Nitrite, UA Negative Negative    Urobilinogen, UA 0.2 E.U./dL 0.2 - 1.0 E.U./dL   Single High Sensitivity Troponin  T    Specimen: Blood   Result Value Ref Range    HS Troponin T <6 <14 ng/L   D-dimer, Quantitative    Specimen: Blood   Result Value Ref Range    D-Dimer, Quantitative <0.27 0.00 - 0.50 MCGFEU/mL   CBC Auto Differential    Specimen: Blood   Result Value Ref Range    WBC 9.71 3.40 - 10.80 10*3/mm3    RBC 4.76 3.77 - 5.28 10*6/mm3    Hemoglobin 13.5 12.0 - 15.9 g/dL    Hematocrit 41.4 34.0 - 46.6 %    MCV 87.0 79.0 - 97.0 fL    MCH 28.4 26.6 - 33.0 pg    MCHC 32.6 31.5 - 35.7 g/dL    RDW 12.7 12.3 - 15.4 %    RDW-SD 40.6 37.0 - 54.0 fl    MPV 10.7 6.0 - 12.0 fL    Platelets 381 140 - 450 10*3/mm3    Neutrophil % 65.5 42.7 - 76.0 %    Lymphocyte % 25.1 19.6 - 45.3 %    Monocyte % 7.5 5.0 - 12.0 %    Eosinophil % 1.3 0.3 - 6.2 %    Basophil % 0.4 0.0 - 1.5 %    Immature Grans % 0.2 0.0 - 0.5 %    Neutrophils, Absolute 6.35 1.70 - 7.00 10*3/mm3    Lymphocytes, Absolute 2.44 0.70 - 3.10 10*3/mm3    Monocytes, Absolute 0.73 0.10 - 0.90 10*3/mm3    Eosinophils, Absolute 0.13 0.00 - 0.40 10*3/mm3    Basophils, Absolute 0.04 0.00 - 0.20 10*3/mm3    Immature Grans, Absolute 0.02 0.00 - 0.05 10*3/mm3    nRBC 0.0 0.0 - 0.2 /100 WBC   POC Urine Pregnancy    Specimen: Urine   Result Value Ref Range    HCG, Urine, QL Negative Negative    Lot Number [id number]     Internal Positive Control Positive Positive, Passed    Internal Negative Control Negative Negative, Passed    Expiration Date 01/28/2025    ECG 12 Lead Dyspnea   Result Value Ref Range    QT Interval 396 ms    QTC Interval 405 ms        If labs were ordered, I independently reviewed the results and considered them in treating the patient.      EMERGENCY DEPARTMENT COURSE and DIFFERENTIAL DIAGNOSIS/MDM:   Vitals:  AS OF 15:43 EDT    BP - 109/97  HR - 63  TEMP - 98.2 °F (36.8 °C) (Oral)  O2 SATS - 96%      Orders placed during this visit:  Orders Placed This Encounter   Procedures    Comprehensive Metabolic Panel    Urinalysis With Microscopic If Indicated (No Culture) - Urine,  Clean Catch    Single High Sensitivity Troponin T    D-dimer, Quantitative    CBC Auto Differential    POC Urine Pregnancy    ECG 12 Lead Dyspnea    Insert Peripheral IV    CBC & Differential       All labs have been independently reviewed by me.  All radiology studies have been reviewed by me and the radiologist dictating the report.  All EKG's have been independently viewed and interpreted by me.      Discussion below represents my analysis of pertinent findings related to patient's condition, differential diagnosis, treatment plan and final disposition.    Differential diagnosis:  The differential diagnosis associated with the patient's presentation includes: Pleurisy, coug, congestion, URI, nausea/vomiting, gastroenteritis, anxiety    Additional sources  Discussed/ obtained information from independent historians:   [] Spouse  [x] Parent  [] Family member  [] Friend  [] EMS   [] Other:    External (non-ED) record review:   [] Inpatient record:   [] Office record:   [] Outpatient record:   [] Prior Outpatient labs:   [] Prior Outpatient radiology:   [] Primary Care record:   [] Outside ED record:   [] Other:     Patient's care impacted by:   [] Diabetes  [] Hypertension  [] CHF  [] Hyperlipidemia  [] Coronary Artery Disease   [] COPD   [] Cancer   [] Tobacco Abuse   [] Substance Abuse    [] Other:     Care significantly affected by Social Determinants of Health (housing and economic circumstances, unemployment)    [] Yes     [x] No   If yes, Patient's care significantly limited by Social Determinants of Health including:   [] Inadequate housing   [] Low income   [] Alcoholism and drug addiction in family   [] Problems related to primary support group   [] Unemployment   [] Problems related to employment   [] Other Social Determinants of Health:       MEDICATIONS ADMINISTERED IN ED:  Medications   sodium chloride 0.9 % flush 10 mL (has no administration in time range)   sodium chloride 0.9 % bolus 1,000 mL (1,000  mL Intravenous New Bag 4/7/24 1444)   ondansetron (ZOFRAN) injection 4 mg (has no administration in time range)   ketorolac (TORADOL) injection 30 mg (30 mg Intravenous Given 4/7/24 1444)              This is a 18-year-old female who has had some shortness of breath and pleuritic type pain with deep inspiration over the last few days.  Some associated nausea and vomiting as well.  She is nontoxic-appearing with stable vital signs on initial assessment.  She is afebrile.  Normal chest x-ray, COVID/flu test at urgent treatment center.  Will obtain EKG, IV, blood work for further evaluation including a D-dimer.  Results as above.  Chest x-ray with no acute cardiac thoracic pathology.  Urinalysis, D-dimer, cardiac enzymes, blood work labs are unremarkable.  On reassessment patient resting comfortably, vital signs are stable.  We discussed continuing with treatment for possible pleuritic pain, anti-inflammatories, follow-up with her PCP in a couple days for reevaluation, return to the ED with any worsening symptoms or further concerns in the meantime.    I had a discussion with the patient/family regarding diagnosis, diagnostic results, treatment plan, and medications.  The patient/family indicated understanding of these instructions.  I spent adequate time at the bedside preceding discharge necessary to personally discuss the aftercare instructions, giving patient education, providing explanations of the results of our evaluations/findings, and my decision making to assure that the patient/family understand the plan of care.  Time was allotted to answer questions at that time and throughout the ED course.  Emphasis was placed on timely follow-up after discharge.  I also discussed the potential for the development of an acute emergent condition requiring further evaluation, admission, or even surgical intervention. I discussed that we found nothing during the visit today indicating the need for further workup, admission,  or the presence of an unstable medical condition.  I encouraged the patient to return to the emergency department immediately for ANY concerns, worsening, new complaints, or if symptoms persist and unable to seek follow-up in a timely fashion.  The patient/family expressed understanding and agreement with this plan.  The patient will follow-up with their PCP in 1-2 days for reevaluation.     PROCEDURES:  Procedures    CRITICAL CARE TIME    Total Critical Care time was 0 minutes, excluding separately reportable procedures.   There was a high probability of clinically significant/life threatening deterioration in the patient's condition which required my urgent intervention.      FINAL IMPRESSION      1. Pleurisy          DISPOSITION/PLAN     ED Disposition       ED Disposition   Discharge    Condition   Stable    Comment   --               PATIENT REFERRED TO:  Danny Rutledge DO  [address] [address]  [phone]    In 2 days      [facility] EMERGENCY DEPARTMENT  [address] [address] [phone]    If symptoms worsen      DISCHARGE MEDICATIONS:     Medication List        START taking these medications      naproxen 500 MG tablet  Commonly known as: Naprosyn  Take 1 tablet by mouth 2 (Two) Times a Day With Meals.            CONTINUE taking these medications      colestipol 1 g tablet  Commonly known as: COLESTID  Take 1 tablet by mouth 2 (Two) Times a Day.     hydrOXYzine pamoate 50 MG capsule  Commonly known as: VISTARIL     levocetirizine 5 MG tablet  Commonly known as: XYZAL  Take 1 tablet by mouth Every Evening.     montelukast 10 MG tablet  Commonly known as: SINGULAIR  TAKE ONE TABLET BY MOUTH ONCE NIGHTLY     traZODone 50 MG tablet  Commonly known as: DESYREL     Trintellix 10 MG tablet tablet  Generic drug: Vortioxetine HBr               Where to Get Your Medications        These medications were sent to [facility] [id number] -  [place], [place] - [address] - [phone] PH - [phone] FX  [address] [address]      Phone: [phone]   naproxen 500 MG tablet             Comment: Please note this report has been produced using speech recognition software.      Charlie Becker DO  Attending Emergency Physician         Charlie Becker DO  04/07/24 4349

## 2024-04-22 DIAGNOSIS — L29.9 PRURITUS: ICD-10-CM

## 2024-04-22 RX ORDER — MONTELUKAST SODIUM 4 MG/1
1 TABLET, CHEWABLE ORAL 2 TIMES DAILY
Qty: 60 TABLET | Refills: 0 | Status: SHIPPED | OUTPATIENT
Start: 2024-04-22

## 2024-04-25 ENCOUNTER — HOSPITAL ENCOUNTER (OUTPATIENT)
Dept: CT IMAGING | Facility: HOSPITAL | Age: 19
Discharge: HOME | End: 2024-04-25
Admitting: FAMILY MEDICINE
Payer: COMMERCIAL

## 2024-04-25 DIAGNOSIS — L29.9 PRURITUS: ICD-10-CM

## 2024-04-25 DIAGNOSIS — R74.8 ELEVATED ALKALINE PHOSPHATASE LEVEL: ICD-10-CM

## 2024-04-25 PROCEDURE — 74178 CT ABD&PLV WO CNTR FLWD CNTR: CPT

## 2024-04-25 PROCEDURE — 25510000001 IOPAMIDOL 61 % SOLUTION: Performed by: FAMILY MEDICINE

## 2024-04-25 RX ADMIN — IOPAMIDOL 85 ML: 612 INJECTION, SOLUTION INTRAVENOUS at 14:22

## 2024-04-26 ENCOUNTER — TRANSCRIBE ORDERS (OUTPATIENT)
Dept: LAB | Facility: HOSPITAL | Age: 19
End: 2024-04-26
Payer: COMMERCIAL

## 2024-04-29 ENCOUNTER — TRANSCRIBE ORDERS (OUTPATIENT)
Dept: LAB | Facility: HOSPITAL | Age: 19
End: 2024-04-29
Payer: COMMERCIAL

## 2024-05-17 ENCOUNTER — TELEPHONE (OUTPATIENT)
Dept: FAMILY MEDICINE CLINIC | Facility: CLINIC | Age: 19
End: 2024-05-17
Payer: COMMERCIAL

## 2024-05-17 NOTE — TELEPHONE ENCOUNTER
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

## 2024-05-19 DIAGNOSIS — L29.9 PRURITUS: ICD-10-CM

## 2024-05-22 RX ORDER — MONTELUKAST SODIUM 4 MG/1
1 TABLET, CHEWABLE ORAL 2 TIMES DAILY
Qty: 180 TABLET | Refills: 0 | Status: SHIPPED | OUTPATIENT
Start: 2024-05-22

## 2024-05-23 ENCOUNTER — TELEPHONE (OUTPATIENT)
Dept: FAMILY MEDICINE CLINIC | Facility: CLINIC | Age: 19
End: 2024-05-23

## 2024-05-23 NOTE — TELEPHONE ENCOUNTER
Caller: Gorge Canela    Relationship: Self    Best call back number: [phone]     What orders are you requesting (i.e. lab or imaging): LABS TO PROVE SHE HAS HAD A TB VACCINATION  AND VERSELLUS    In what timeframe would the patient need to come in: 05/24/24    Where will you receive your lab/imaging services: IN OFFICE

## 2024-05-24 ENCOUNTER — LAB (OUTPATIENT)
Dept: LAB | Facility: HOSPITAL | Age: 19
End: 2024-05-24
Payer: COMMERCIAL

## 2024-05-24 ENCOUNTER — OFFICE VISIT (OUTPATIENT)
Dept: FAMILY MEDICINE CLINIC | Facility: CLINIC | Age: 19
End: 2024-05-24
Payer: COMMERCIAL

## 2024-05-24 VITALS
HEART RATE: 104 BPM | SYSTOLIC BLOOD PRESSURE: 112 MMHG | WEIGHT: 240 LBS | TEMPERATURE: 98.1 F | BODY MASS INDEX: 40.97 KG/M2 | HEIGHT: 64 IN | DIASTOLIC BLOOD PRESSURE: 68 MMHG

## 2024-05-24 DIAGNOSIS — B07.9 WARTS OF FOOT: Primary | ICD-10-CM

## 2024-05-24 DIAGNOSIS — Z11.1 SCREENING FOR TUBERCULOSIS: ICD-10-CM

## 2024-05-24 DIAGNOSIS — Z00.00 PREVENTATIVE HEALTH CARE: ICD-10-CM

## 2024-05-24 PROCEDURE — 86480 TB TEST CELL IMMUN MEASURE: CPT

## 2024-05-24 PROCEDURE — 86787 VARICELLA-ZOSTER ANTIBODY: CPT

## 2024-05-24 RX ORDER — FAMOTIDINE 20 MG/1
TABLET, FILM COATED ORAL
COMMUNITY
Start: 2024-04-23

## 2024-05-24 RX ORDER — IMIQUIMOD 12.5 MG/.25G
CREAM TOPICAL
Qty: 24 EACH | Refills: 0 | Status: SHIPPED | OUTPATIENT
Start: 2024-05-24

## 2024-05-25 LAB — VZV IGG SER IA-ACNC: 737 INDEX

## 2024-05-26 LAB
GAMMA INTERFERON BACKGROUND BLD IA-ACNC: 0 IU/ML
M TB IFN-G BLD-IMP: NEGATIVE
M TB IFN-G CD4+ BCKGRND COR BLD-ACNC: 0 IU/ML
M TB IFN-G CD4+CD8+ BCKGRND COR BLD-ACNC: 0 IU/ML
MITOGEN IGNF BCKGRD COR BLD-ACNC: >10 IU/ML
QUANTIFERON INCUBATION: NORMAL
SERVICE CMNT-IMP: NORMAL

## 2024-05-30 RX ORDER — MONTELUKAST SODIUM 10 MG/1
10 TABLET ORAL NIGHTLY
Qty: 90 TABLET | Refills: 0 | Status: SHIPPED | OUTPATIENT
Start: 2024-05-30

## 2024-06-09 NOTE — PROGRESS NOTES
Established Patient Office Visit      Patient Name: Gorge Canela  : 2005   MRN: [id number]   Care Team: Patient Care Team:  Danny Rutledge DO as PCP - General (Family Medicine)    Chief Complaint:    Chief Complaint   Patient presents with    Wart     On left foot between toes       History of Present Illness: Gorge Canela is a 18 y.o. female who is here today for chief complaint.    HPI    Wart on foot    This patient is accompanied by their self who contributes to the history of their care.    The following portions of the patient's history were reviewed and updated as appropriate: allergies, current medications, past family history, past medical history, past social history, past surgical history and problem list.    Subjective      Review of Systems:   Review of Systems - See HPI    Past Medical History:   Past Medical History:   Diagnosis Date    Allergic rhinitis due to cats     Anxiety        Past Surgical History:   Past Surgical History:   Procedure Laterality Date    CYST REMOVAL Left     left arm/wrist       Family History: History reviewed. No pertinent family history.    Social History:   Social History     Socioeconomic History    Marital status: Single   Tobacco Use    Smoking status: Never     Passive exposure: Yes    Smokeless tobacco: Never   Vaping Use    Vaping status: Never Used   Substance and Sexual Activity    Alcohol use: Yes    Drug use: Never    Sexual activity: Defer       Tobacco History:   Social History     Tobacco Use   Smoking Status Never    Passive exposure: Yes   Smokeless Tobacco Never       Medications:     Current Outpatient Medications:     colestipol (COLESTID) 1 g tablet, TAKE 1 TABLET BY MOUTH 2 TIMES A DAY, Disp: 180 tablet, Rfl: 0    famotidine (PEPCID) 20 MG tablet, , Disp: , Rfl:     hydrOXYzine pamoate (VISTARIL) 50 MG capsule, Take 1 capsule by mouth 2 (Two) Times a Day., Disp: , Rfl:     levocetirizine (XYZAL) 5 MG tablet, Take 1 tablet by mouth  "Every Evening., Disp: 90 tablet, Rfl: 3    naproxen (Naprosyn) 500 MG tablet, Take 1 tablet by mouth 2 (Two) Times a Day With Meals., Disp: 20 tablet, Rfl: 0    traZODone (DESYREL) 50 MG tablet, Take 1 tablet by mouth Every Night., Disp: , Rfl:     Vortioxetine HBr (Trintellix) 10 MG tablet tablet, Take 1 tablet by mouth Daily With Breakfast., Disp: 90 tablet, Rfl: 1    imiquimod (Aldara) 5 % cream, Apply thin layer to affected area Monday, Wednesday, and Friday nights. Wash off in AM. Use for 4 weeks., Disp: 24 each, Rfl: 0    montelukast (SINGULAIR) 10 MG tablet, TAKE ONE TABLET BY MOUTH ONCE NIGHTLY, Disp: 90 tablet, Rfl: 0    Allergies:   Allergies   Allergen Reactions    Delsym [Dextromethorphan] Rash    Lexapro [Escitalopram] Rash       Objective   Objective     Physical Exam:  Vital Signs:   Vitals:    05/24/24 1058   BP: 112/68   Pulse: 104   Temp: 98.1 °F (36.7 °C)   TempSrc: Infrared   Weight: 109 kg (240 lb)   Height: 162.6 cm (64\")   PainSc:   4   PainLoc: Toe     Body mass index is 41.2 kg/m².     Physical Exam  Nursing note reviewed  Const: NAD, A&Ox4, Pleasant, Cooperative  Eyes: EOMI, no conjunctivitis  ENT: No nasal discharge present, neck supple  Cardiac: Regular rate and rhythm, no cyanosis  Resp: Respiratory rate within normal limits, no increased work of breathing, no audible wheezing or retractions noted  GI: No distention or ascites  MSK: Motor and sensation grossly intact in bilateral upper extremities  Neurologic: CN II-XII grossly intact  Psych: Appropriate mood and behavior.  Skin: Warm, dry  Procedures/Radiology     Procedures  No radiology results for the last 7 days     Assessment & Plan   Assessment / Plan      Assessment/Plan:   Problems Addressed This Visit  Diagnoses and all orders for this visit:    1. Warts of foot (Primary)  -     imiquimod (Aldara) 5 % cream; Apply thin layer to affected area Monday, Wednesday, and Friday nights. Wash off in AM. Use for 4 weeks.  Dispense: 24 " each; Refill: 0    2. Screening for tuberculosis  -     QuantiFERON-TB Gold Plus; Future    3. Preventative health care  -     Varicella Zoster Antibody, IgG; Future      Problem List Items Addressed This Visit    None  Visit Diagnoses       Warts of foot    -  Primary    Relevant Medications    imiquimod (Aldara) 5 % cream    Screening for tuberculosis        Relevant Orders    QuantiFERON-TB Gold Plus (Completed)    Preventative health care        Relevant Orders    Varicella Zoster Antibody, IgG (Completed)              Patient Instructions   Cover cream with duct tape. Reapply MWF    Follow Up:   Return in about 1 month (around 6/24/2024) for wart.        DO [facility] [address]  [facility]  [address] [place] [address]  [facility] [address]  Fax [phone]  Phone [phone]

## 2024-08-14 ENCOUNTER — OFFICE VISIT (OUTPATIENT)
Dept: FAMILY MEDICINE CLINIC | Facility: CLINIC | Age: 19
End: 2024-08-14
Payer: COMMERCIAL

## 2024-08-14 VITALS
HEART RATE: 70 BPM | OXYGEN SATURATION: 97 % | BODY MASS INDEX: 49.34 KG/M2 | WEIGHT: 289 LBS | HEIGHT: 64 IN | DIASTOLIC BLOOD PRESSURE: 80 MMHG | SYSTOLIC BLOOD PRESSURE: 110 MMHG

## 2024-08-14 DIAGNOSIS — L29.9 PRURITUS: ICD-10-CM

## 2024-08-14 DIAGNOSIS — R44.3 HALLUCINATIONS: Primary | ICD-10-CM

## 2024-08-14 DIAGNOSIS — F41.9 ANXIETY: ICD-10-CM

## 2024-08-14 PROCEDURE — 99214 OFFICE O/P EST MOD 30 MIN: CPT | Performed by: FAMILY MEDICINE

## 2024-08-14 PROCEDURE — 1125F AMNT PAIN NOTED PAIN PRSNT: CPT | Performed by: FAMILY MEDICINE

## 2024-08-14 RX ORDER — CIMETIDINE 300 MG
300 TABLET ORAL 4 TIMES DAILY PRN
Qty: 120 TABLET | Refills: 0 | Status: SHIPPED | OUTPATIENT
Start: 2024-08-14

## 2024-08-14 NOTE — PROGRESS NOTES
Subjective   Gorge Canela is a 18 y.o. female.     Chief Complaint   Patient presents with    URI     Patient was feeling bad but now feeling better wants to discuss medications        History of Present Illness     Gorge Canela presents today for   Chief Complaint   Patient presents with    URI     Patient was feeling bad but now feeling better wants to discuss medications      Was originally having URI symptoms, now improved and wants to discuss psych meds. Had side effects (hallucinations), wants to change meds.    The following portions of the patient's history were reviewed and updated as appropriate: allergies, current medications, past family history, past medical history, past social history, past surgical history and problem list.    Active Ambulatory Problems     Diagnosis Date Noted    Severe episode of recurrent major depressive disorder, without psychotic features 05/05/2023    Borderline personality disorder 09/22/2023    Anxiety 03/21/2024     Resolved Ambulatory Problems     Diagnosis Date Noted    No Resolved Ambulatory Problems     Past Medical History:   Diagnosis Date    Allergic rhinitis due to cats      Past Surgical History:   Procedure Laterality Date    CYST REMOVAL Left     left arm/wrist     History reviewed. No pertinent family history.  Social History     Socioeconomic History    Marital status: Single   Tobacco Use    Smoking status: Never     Passive exposure: Yes    Smokeless tobacco: Never   Vaping Use    Vaping status: Never Used   Substance and Sexual Activity    Alcohol use: Yes    Drug use: Never    Sexual activity: Defer       Review of Systems  Review of Systems -   General ROS: positive for  - fever and malaise  negative for - hot flashes, night sweats or weight loss  ENT ROS: positive for - headaches, nasal congestion, nasal discharge, sinus pain and sore throat  negative for - epistaxis, oral lesions, tinnitus or visual changes  Respiratory ROS: positive for - cough  "and sputum changes  negative for - hemoptysis, pleuritic pain, shortness of breath or wheezing  Cardiovascular ROS: no chest pain or dyspnea on exertion    Objective   Blood pressure 110/80, pulse 70, height 162.6 cm (64\"), weight 131 kg (289 lb), SpO2 97%.  Nursing note reviewed  Physical Exam  Const: Mildly ill-appearing but in no acute distress, A&Ox4, Pleasant, Cooperative  Eyes: EOMI, no conjunctivitis  ENT: There is moderate nasal discharge present, nasal congestion noted.  There is slight congestion of the mucous membranes.  There is mild submandibular and anterior cervical lymphadenopathy consistent with symptoms.  Cardiac: Regular rate and rhythm, no cyanosis  Resp: Respiratory rate within normal limits, no increased work of breathing, no audible wheezing or retractions noted.  There are slight rhonchi noted, a cough is appreciated sporadically.  GI: No distention or ascites  Psych: Appropriate mood and behavior.  Skin: Warm, dry  Procedures  Assessment & Plan     Problem List Items Addressed This Visit          Mental Health    Anxiety    Overview     Cannot tolerate escitalopram, failed sertraline, fluoxetine         Relevant Medications    Vortioxetine HBr (Trintellix) 10 MG tablet tablet     Other Visit Diagnoses       Hallucinations    -  Primary    Pruritus        Relevant Medications    cimetidine (TAGAMET) 300 MG tablet            Patient was encouraged to practice proper hygiene as well as use the symptomatic medications indicated above.  If no better they were encouraged to return to our office if needed.  Zinc lozenges may be effective in preventing the spread of viral upper respiratory infections including the common cold, and they were counseled on family member use and prophylactic use of these medications. she was encouraged to maintain adequate hydration with Pedialyte if possible.  They were cautioned on the risk of viral myocarditis, and encouraged to avoid exercise or significant exertion " while febrile or while symptoms extend below the neck.    Patient Instructions   OTC Tagamet (cimetidine) you can just take 2 tablets instead     Ambulatory progress note signed and attested to by Danny Rutledge D.O.

## 2024-08-16 DIAGNOSIS — L29.9 PRURITUS: ICD-10-CM

## 2024-08-19 RX ORDER — MONTELUKAST SODIUM 4 MG/1
1 TABLET, CHEWABLE ORAL 2 TIMES DAILY
Qty: 180 TABLET | Refills: 0 | Status: SHIPPED | OUTPATIENT
Start: 2024-08-19

## 2024-08-26 RX ORDER — MONTELUKAST SODIUM 10 MG/1
10 TABLET ORAL NIGHTLY
Qty: 90 TABLET | Refills: 0 | OUTPATIENT
Start: 2024-08-26

## 2024-09-13 DIAGNOSIS — L29.9 PRURITUS: ICD-10-CM

## 2024-09-13 RX ORDER — CIMETIDINE 300 MG
TABLET ORAL
Qty: 270 TABLET | Refills: 0 | Status: SHIPPED | OUTPATIENT
Start: 2024-09-13

## 2024-10-09 ENCOUNTER — TELEMEDICINE (OUTPATIENT)
Dept: FAMILY MEDICINE CLINIC | Facility: TELEHEALTH | Age: 19
End: 2024-10-09
Payer: COMMERCIAL

## 2024-10-09 DIAGNOSIS — Z76.89 RETURN TO WORK EVALUATION: Primary | ICD-10-CM

## 2024-10-09 NOTE — LETTER
October 9, 2024     Patient: Gorge Canela   YOB: 2005   Date of Visit: 10/9/2024       To Whom It May Concern:    It is my medical opinion that Gorge Canela may return to work/school on 10/10/24 if fever free and symptoms improved.            Sincerely,  Maricarmen RICE      URGENT CARE VIDEO VISIT PROVIDER    CC: No Recipients

## 2024-10-10 NOTE — PATIENT INSTRUCTIONS
Viral Gastroenteritis, Adult    Viral gastroenteritis is also known as the stomach flu. This condition may affect your stomach, small intestine, and large intestine. It can cause sudden watery diarrhea, fever, and vomiting. This condition is caused by many different viruses. These viruses can be passed from person to person very easily (are contagious).  Diarrhea and vomiting can make you feel weak and cause you to become dehydrated. You may not be able to keep fluids down. Dehydration can make you tired and thirsty, cause you to have a dry mouth, and decrease how often you urinate. It is important to replace the fluids that you lose from diarrhea and vomiting.  What are the causes?  Gastroenteritis is caused by many viruses, including rotavirus and norovirus. Norovirus is the most common cause in adults. You can get sick after being exposed to the viruses from other people. You can also get sick by:  Eating food, drinking water, or touching a surface contaminated with one of these viruses.  Sharing utensils or other personal items with an infected person.  What increases the risk?  You are more likely to develop this condition if you:  Have a weak body defense system (immune system).  Live with one or more children who are younger than 2 years.  Live in a nursing home.  Travel on cruise ships.  What are the signs or symptoms?  Symptoms of this condition start suddenly 1-3 days after exposure to a virus. Symptoms may last for a few days or for as long as a week. Common symptoms include watery diarrhea and vomiting. Other symptoms include:  Fever.  Headache.  Fatigue.  Pain in the abdomen.  Chills.  Weakness.  Nausea.  Muscle aches.  Loss of appetite.  How is this diagnosed?  This condition is diagnosed with a medical history and physical exam. You may also have a stool test to check for viruses or other infections.  How is this treated?  This condition typically goes away on its own. The focus of treatment is to  prevent dehydration and restore lost fluids (rehydration). This condition may be treated with:  An oral rehydration solution (ORS) to replace important salts and minerals (electrolytes) in your body. Take this if told by your health care provider. This is a drink that is sold at pharmacies and retail stores.  Medicines to help with your symptoms.  Probiotic supplements to reduce symptoms of diarrhea.  Fluids given through an IV, if dehydration is severe.  Older adults and people with other diseases or a weak immune system are at higher risk for dehydration.  Follow these instructions at home:  Eating and drinking    Take an ORS as told by your health care provider.  Drink clear fluids in small amounts as you are able. Clear fluids include:  Water.  Ice chips.  Diluted fruit juice.  Low-calorie sports drinks.  Drink enough fluid to keep your urine pale yellow.  Eat small amounts of healthy foods every 3-4 hours as you are able. This may include whole grains, fruits, vegetables, lean meats, and yogurt.  Avoid fluids that contain a lot of sugar or caffeine, such as energy drinks, sports drinks, and soda.  Avoid spicy or fatty foods.  Avoid alcohol.  General instructions    Wash your hands often, especially after having diarrhea or vomiting. If soap and water are not available, use hand .  Make sure that all people in your household wash their hands well and often.  Take over-the-counter and prescription medicines only as told by your health care provider.  Rest at home while you recover.  Watch your condition for any changes.  Take a warm bath to relieve any burning or pain from frequent diarrhea episodes.  Keep all follow-up visits. This is important.  Contact a health care provider if you:  Cannot keep fluids down.  Have symptoms that get worse.  Have new symptoms.  Feel light-headed or dizzy.  Have muscle cramps.  Get help right away if you:  Have chest pain.  Have trouble breathing or you are breathing  very quickly.  Have a fast heartbeat.  Feel extremely weak or you faint.  Have a severe headache, a stiff neck, or both.  Have a rash.  Have severe pain, cramping, or bloating in your abdomen.  Have skin that feels cold and clammy.  Feel confused.  Have pain when you urinate.  Have signs of dehydration, such as:  Dark urine, very little urine, or no urine.  Cracked lips.  Dry mouth.  Sunken eyes.  Sleepiness.  Weakness.  Have signs of bleeding, such as:  Seeing blood in your vomit.  Having vomit that looks like coffee grounds.  Having bloody or black stools or stools that look like tar.  These symptoms may be an emergency. Get help right away. Call 911.  Do not wait to see if the symptoms will go away.  Do not drive yourself to the hospital.  Summary  Viral gastroenteritis is also known as the stomach flu. It can cause sudden watery diarrhea, fever, and vomiting.  This condition can be passed from person to person very easily (is contagious).  Take an oral rehydration solution (ORS) if told by your health care provider. This is a drink that is sold at pharmacies and retail stores.  Wash your hands often, especially after having diarrhea or vomiting. If soap and water are not available, use hand .  This information is not intended to replace advice given to you by your health care provider. Make sure you discuss any questions you have with your health care provider.  Document Revised: 10/17/2022 Document Reviewed: 10/17/2022  [employer] Patient Education © 2024 [employer]

## 2024-10-10 NOTE — PROGRESS NOTES
You have chosen to receive care through a telehealth visit.  Do you consent to use a video/audio connection for your medical care today? Yes     CHIEF COMPLAINT  Chief Complaint   Patient presents with    return to school         HPI  Gorge Canela is a 19 y.o. female  presents with complaint of stomach ache, diarrhea, vomiting  week ago Denies any further symptoms. Reports she didn't take a COVID test. Reports no fever or chills. No nausea or vomiting. No CP or SOA Reports when she had symptoms she took flu and cold medication that helped her.     Review of Systems   Constitutional:  Negative for chills, fatigue and fever.   HENT:  Negative for congestion (resolved), ear discharge, ear pain, sinus pressure, sinus pain and sore throat.    Respiratory:  Negative for cough, chest tightness, shortness of breath and wheezing.    Cardiovascular:  Negative for chest pain.   Gastrointestinal:  Negative for abdominal pain, diarrhea (resolved), nausea (resolved) and vomiting (resolved).   Musculoskeletal:  Negative for back pain and myalgias.   Neurological:  Negative for dizziness and headaches.   Psychiatric/Behavioral: Negative.         Past Medical History:   Diagnosis Date    Allergic rhinitis due to cats     Anxiety        History reviewed. No pertinent family history.    Social History     Socioeconomic History    Marital status: Single   Tobacco Use    Smoking status: Never     Passive exposure: Yes    Smokeless tobacco: Never   Vaping Use    Vaping status: Never Used   Substance and Sexual Activity    Alcohol use: Yes    Drug use: Never    Sexual activity: Defer       Gorge Canela  reports that she has never smoked. She has been exposed to tobacco smoke. She has never used smokeless tobacco. I have educated her on the risk of diseases from using tobacco products such as cancer, COPD, and heart disease.     I advised her to quit vaping     I spent  1  minutes counseling the patient.              LMP 10/06/2024    Breastfeeding No     PHYSICAL EXAM  Physical Exam   Constitutional: She is oriented to person, place, and time. She appears well-developed and well-nourished. No distress.   HENT:   Head: Normocephalic and atraumatic.   Right Ear: Hearing normal.   Left Ear: Hearing normal.   Nose: No congestion. Right sinus exhibits no maxillary sinus tenderness. Left sinus exhibits no maxillary sinus tenderness.   Mouth/Throat: Mouth/Lips are normal.  Patient directed exam   Eyes: Conjunctivae and lids are normal.   Pulmonary/Chest: Effort normal.  No respiratory distress.  Neurological: She is alert and oriented to person, place, and time.   Psychiatric: She has a normal mood and affect. Her speech is normal and behavior is normal.           Diagnoses and all orders for this visit:    1. Return to work evaluation (Primary)    May return to work  If symptoms return see your PCP   For any worsening symptoms go to ER such as high fever, abdominal pain or nausea vomtiing     FOLLOW-UP  As discussed during visit with PCP/[facility] if no improvement or Urgent Care/Emergency Department if worsening of symptoms    Patient verbalizes understanding of medication dosage, comfort measures, instructions for treatment and follow-up.    Maricarmen Mcgovern, KRYSTAL  10/09/2024  01:24 EDT    The use of a video visit has been reviewed with the patient and verbal informed consent has been obtained. Myself and Gorge Canela participated in this visit. The patient is located in [address] [place] [address].    I am located in [place], [place]. Mychart and Twilio were utilized. I spent 5 minutes in the patient's chart for this visit.      Note Disclaimer: At [facility], we believe that sharing information builds trust and better   relationships. You are receiving this note because you recently visited [facility]. It is possible you   will see health information before a provider has talked with you about it. This kind of information  can   be easy to misunderstand. To help you fully understand what it means for your health, we urge you to   discuss this note with your provider.

## 2024-10-18 DIAGNOSIS — L29.9 PRURITUS: ICD-10-CM

## 2024-10-18 RX ORDER — MONTELUKAST SODIUM 4 MG/1
1 TABLET, CHEWABLE ORAL 2 TIMES DAILY
Qty: 180 TABLET | Refills: 0 | Status: SHIPPED | OUTPATIENT
Start: 2024-10-18

## 2024-11-30 DIAGNOSIS — L29.9 PRURITUS: ICD-10-CM

## 2024-12-02 RX ORDER — CIMETIDINE 300 MG
TABLET ORAL
Qty: 270 TABLET | Refills: 0 | Status: SHIPPED | OUTPATIENT
Start: 2024-12-02

## 2024-12-02 NOTE — TELEPHONE ENCOUNTER
Rx Refill Note  Requested Prescriptions     Pending Prescriptions Disp Refills    cimetidine (TAGAMET) 300 MG tablet [Pharmacy Med Name: CIMETIDINE 300 MG TABLET] 270 tablet 0     Sig: TAKE 1 TABLET BY MOUTH 4 TIMES A DAY AS NEEDED FOR ITCHING      Last office visit with prescribing clinician: 8/14/2024   Last telemedicine visit with prescribing clinician: Visit date not found   Next office visit with prescribing clinician: Visit date not found     Lula Reese MA  12/02/24, 12:05 EST

## 2025-02-10 LAB
B-HCG UR QL: NEGATIVE
BASOPHILS # BLD AUTO: 0.04 10*3/MM3 (ref 0–0.2)
BASOPHILS NFR BLD AUTO: 0.2 % (ref 0–1.5)
BILIRUB UR QL STRIP: ABNORMAL
CLARITY UR: ABNORMAL
COLOR UR: ABNORMAL
DEPRECATED RDW RBC AUTO: 38.4 FL (ref 37–54)
EOSINOPHIL # BLD AUTO: 0.01 10*3/MM3 (ref 0–0.4)
EOSINOPHIL NFR BLD AUTO: 0.1 % (ref 0.3–6.2)
ERYTHROCYTE [DISTWIDTH] IN BLOOD BY AUTOMATED COUNT: 12.6 % (ref 12.3–15.4)
EXPIRATION DATE: NORMAL
GLUCOSE UR STRIP-MCNC: NEGATIVE MG/DL
HCT VFR BLD AUTO: 45.6 % (ref 34–46.6)
HGB BLD-MCNC: 15.6 G/DL (ref 12–15.9)
HGB UR QL STRIP.AUTO: NEGATIVE
IMM GRANULOCYTES # BLD AUTO: 0.1 10*3/MM3 (ref 0–0.05)
IMM GRANULOCYTES NFR BLD AUTO: 0.6 % (ref 0–0.5)
INTERNAL NEGATIVE CONTROL: NEGATIVE
INTERNAL POSITIVE CONTROL: POSITIVE
KETONES UR QL STRIP: ABNORMAL
LEUKOCYTE ESTERASE UR QL STRIP.AUTO: ABNORMAL
LYMPHOCYTES # BLD AUTO: 2.07 10*3/MM3 (ref 0.7–3.1)
LYMPHOCYTES NFR BLD AUTO: 12.9 % (ref 19.6–45.3)
Lab: NORMAL
MCH RBC QN AUTO: 28.5 PG (ref 26.6–33)
MCHC RBC AUTO-ENTMCNC: 34.2 G/DL (ref 31.5–35.7)
MCV RBC AUTO: 83.2 FL (ref 79–97)
MONOCYTES # BLD AUTO: 1.17 10*3/MM3 (ref 0.1–0.9)
MONOCYTES NFR BLD AUTO: 7.3 % (ref 5–12)
NEUTROPHILS NFR BLD AUTO: 12.68 10*3/MM3 (ref 1.7–7)
NEUTROPHILS NFR BLD AUTO: 78.9 % (ref 42.7–76)
NITRITE UR QL STRIP: POSITIVE
NRBC BLD AUTO-RTO: 0 /100 WBC (ref 0–0.2)
PH UR STRIP.AUTO: 5.5 [PH] (ref 5–8)
PLATELET # BLD AUTO: 330 10*3/MM3 (ref 140–450)
PMV BLD AUTO: 12.1 FL (ref 6–12)
PROT UR QL STRIP: ABNORMAL
RBC # BLD AUTO: 5.48 10*6/MM3 (ref 3.77–5.28)
SP GR UR STRIP: 1.04 (ref 1–1.03)
UROBILINOGEN UR QL STRIP: ABNORMAL
WBC NRBC COR # BLD AUTO: 16.07 10*3/MM3 (ref 3.4–10.8)

## 2025-02-10 PROCEDURE — 25010000002 DICYCLOMINE PER 20 MG: Performed by: EMERGENCY MEDICINE

## 2025-02-10 PROCEDURE — 96375 TX/PRO/DX INJ NEW DRUG ADDON: CPT

## 2025-02-10 PROCEDURE — 85025 COMPLETE CBC W/AUTO DIFF WBC: CPT | Performed by: EMERGENCY MEDICINE

## 2025-02-10 PROCEDURE — 83690 ASSAY OF LIPASE: CPT | Performed by: EMERGENCY MEDICINE

## 2025-02-10 PROCEDURE — 81001 URINALYSIS AUTO W/SCOPE: CPT | Performed by: EMERGENCY MEDICINE

## 2025-02-10 PROCEDURE — 87086 URINE CULTURE/COLONY COUNT: CPT | Performed by: EMERGENCY MEDICINE

## 2025-02-10 PROCEDURE — 99291 CRITICAL CARE FIRST HOUR: CPT

## 2025-02-10 PROCEDURE — 81025 URINE PREGNANCY TEST: CPT | Performed by: EMERGENCY MEDICINE

## 2025-02-10 PROCEDURE — 80053 COMPREHEN METABOLIC PANEL: CPT | Performed by: EMERGENCY MEDICINE

## 2025-02-10 PROCEDURE — 96372 THER/PROPH/DIAG INJ SC/IM: CPT

## 2025-02-10 PROCEDURE — 25010000002 ONDANSETRON PER 1 MG: Performed by: EMERGENCY MEDICINE

## 2025-02-10 RX ORDER — SODIUM CHLORIDE 0.9 % (FLUSH) 0.9 %
10 SYRINGE (ML) INJECTION AS NEEDED
Status: DISCONTINUED | OUTPATIENT
Start: 2025-02-10 | End: 2025-02-13 | Stop reason: HOSPADM

## 2025-02-10 RX ORDER — ONDANSETRON 2 MG/ML
4 INJECTION INTRAMUSCULAR; INTRAVENOUS ONCE
Status: COMPLETED | OUTPATIENT
Start: 2025-02-11 | End: 2025-02-10

## 2025-02-10 RX ORDER — DICYCLOMINE HYDROCHLORIDE 10 MG/ML
20 INJECTION INTRAMUSCULAR ONCE
Status: COMPLETED | OUTPATIENT
Start: 2025-02-11 | End: 2025-02-10

## 2025-02-10 RX ADMIN — DICYCLOMINE HYDROCHLORIDE 20 MG: 10 INJECTION, SOLUTION INTRAMUSCULAR at 23:42

## 2025-02-10 RX ADMIN — ONDANSETRON 4 MG: 2 INJECTION INTRAMUSCULAR; INTRAVENOUS at 23:36

## 2025-02-11 ENCOUNTER — HOSPITAL ENCOUNTER (OUTPATIENT)
Facility: HOSPITAL | Age: 20
Setting detail: OBSERVATION
Discharge: HOME | End: 2025-02-13
Attending: EMERGENCY MEDICINE | Admitting: INTERNAL MEDICINE
Payer: COMMERCIAL

## 2025-02-11 ENCOUNTER — APPOINTMENT (OUTPATIENT)
Dept: CT IMAGING | Facility: HOSPITAL | Age: 20
End: 2025-02-11
Payer: COMMERCIAL

## 2025-02-11 ENCOUNTER — APPOINTMENT (OUTPATIENT)
Dept: GENERAL RADIOLOGY | Facility: HOSPITAL | Age: 20
End: 2025-02-11
Payer: COMMERCIAL

## 2025-02-11 DIAGNOSIS — E87.6 HYPOKALEMIA: ICD-10-CM

## 2025-02-11 DIAGNOSIS — J06.9 VIRAL UPPER RESPIRATORY ILLNESS: ICD-10-CM

## 2025-02-11 DIAGNOSIS — R11.10 VOMITING IN ADULT: ICD-10-CM

## 2025-02-11 DIAGNOSIS — N39.0 SEPSIS DUE TO URINARY TRACT INFECTION: Primary | ICD-10-CM

## 2025-02-11 DIAGNOSIS — A41.9 SEPSIS DUE TO URINARY TRACT INFECTION: Primary | ICD-10-CM

## 2025-02-11 DIAGNOSIS — J98.2 PNEUMOMEDIASTINUM: ICD-10-CM

## 2025-02-11 PROBLEM — R11.2 NAUSEA AND VOMITING: Status: ACTIVE | Noted: 2025-02-11

## 2025-02-11 LAB
ALBUMIN SERPL-MCNC: 4.5 G/DL (ref 3.5–5.2)
ALBUMIN/GLOB SERPL: 1.3 G/DL
ALP SERPL-CCNC: 115 U/L (ref 39–117)
ALT SERPL W P-5'-P-CCNC: 24 U/L (ref 1–33)
ANION GAP SERPL CALCULATED.3IONS-SCNC: 14 MMOL/L (ref 5–15)
ANION GAP SERPL CALCULATED.3IONS-SCNC: 19 MMOL/L (ref 5–15)
AST SERPL-CCNC: 28 U/L (ref 1–32)
BACTERIA UR QL AUTO: ABNORMAL /HPF
BASOPHILS # BLD AUTO: 0 10*3/MM3 (ref 0–0.2)
BASOPHILS NFR BLD AUTO: 0 % (ref 0–1.5)
BILIRUB SERPL-MCNC: 0.7 MG/DL (ref 0–1.2)
BUN SERPL-MCNC: 13 MG/DL (ref 6–20)
BUN SERPL-MCNC: 9 MG/DL (ref 6–20)
BUN/CREAT SERPL: 12.3 (ref 7–25)
BUN/CREAT SERPL: 16.1 (ref 7–25)
CALCIUM SPEC-SCNC: 8.6 MG/DL (ref 8.6–10.5)
CALCIUM SPEC-SCNC: 9.7 MG/DL (ref 8.6–10.5)
CHLORIDE SERPL-SCNC: 103 MMOL/L (ref 98–107)
CHLORIDE SERPL-SCNC: 96 MMOL/L (ref 98–107)
CO2 SERPL-SCNC: 24 MMOL/L (ref 22–29)
CO2 SERPL-SCNC: 24 MMOL/L (ref 22–29)
CREAT SERPL-MCNC: 0.56 MG/DL (ref 0.57–1)
CREAT SERPL-MCNC: 1.06 MG/DL (ref 0.57–1)
D-LACTATE SERPL-SCNC: 1.8 MMOL/L (ref 0.5–2)
DEPRECATED RDW RBC AUTO: 39.2 FL (ref 37–54)
EGFRCR SERPLBLD CKD-EPI 2021: 134.2 ML/MIN/1.73
EGFRCR SERPLBLD CKD-EPI 2021: 77.3 ML/MIN/1.73
EOSINOPHIL # BLD AUTO: 0 10*3/MM3 (ref 0–0.4)
EOSINOPHIL NFR BLD AUTO: 0 % (ref 0.3–6.2)
ERYTHROCYTE [DISTWIDTH] IN BLOOD BY AUTOMATED COUNT: 12.8 % (ref 12.3–15.4)
GLOBULIN UR ELPH-MCNC: 3.6 GM/DL
GLUCOSE SERPL-MCNC: 111 MG/DL (ref 65–99)
GLUCOSE SERPL-MCNC: 129 MG/DL (ref 65–99)
HCT VFR BLD AUTO: 37 % (ref 34–46.6)
HGB BLD-MCNC: 12.7 G/DL (ref 12–15.9)
HYALINE CASTS UR QL AUTO: ABNORMAL /LPF
IMM GRANULOCYTES # BLD AUTO: 0.01 10*3/MM3 (ref 0–0.05)
IMM GRANULOCYTES NFR BLD AUTO: 0.1 % (ref 0–0.5)
LIPASE SERPL-CCNC: 21 U/L (ref 13–60)
LYMPHOCYTES # BLD AUTO: 1.08 10*3/MM3 (ref 0.7–3.1)
LYMPHOCYTES NFR BLD AUTO: 15.7 % (ref 19.6–45.3)
MAGNESIUM SERPL-MCNC: 2.2 MG/DL (ref 1.7–2.2)
MCH RBC QN AUTO: 28.9 PG (ref 26.6–33)
MCHC RBC AUTO-ENTMCNC: 34.3 G/DL (ref 31.5–35.7)
MCV RBC AUTO: 84.1 FL (ref 79–97)
MONOCYTES # BLD AUTO: 0.67 10*3/MM3 (ref 0.1–0.9)
MONOCYTES NFR BLD AUTO: 9.7 % (ref 5–12)
NEUTROPHILS NFR BLD AUTO: 5.14 10*3/MM3 (ref 1.7–7)
NEUTROPHILS NFR BLD AUTO: 74.5 % (ref 42.7–76)
NRBC BLD AUTO-RTO: 0 /100 WBC (ref 0–0.2)
PHOSPHATE SERPL-MCNC: 4 MG/DL (ref 2.5–4.5)
PLATELET # BLD AUTO: 243 10*3/MM3 (ref 140–450)
PMV BLD AUTO: 12.7 FL (ref 6–12)
POTASSIUM SERPL-SCNC: 3.3 MMOL/L (ref 3.5–5.2)
POTASSIUM SERPL-SCNC: 3.4 MMOL/L (ref 3.5–5.2)
PROT SERPL-MCNC: 8.1 G/DL (ref 6–8.5)
RBC # BLD AUTO: 4.4 10*6/MM3 (ref 3.77–5.28)
RBC # UR STRIP: ABNORMAL /HPF
REF LAB TEST METHOD: ABNORMAL
SODIUM SERPL-SCNC: 139 MMOL/L (ref 136–145)
SODIUM SERPL-SCNC: 141 MMOL/L (ref 136–145)
SQUAMOUS #/AREA URNS HPF: ABNORMAL /HPF
WBC # UR STRIP: ABNORMAL /HPF
WBC CASTS #/AREA URNS LPF: ABNORMAL /LPF
WBC NRBC COR # BLD AUTO: 6.9 10*3/MM3 (ref 3.4–10.8)

## 2025-02-11 PROCEDURE — 96376 TX/PRO/DX INJ SAME DRUG ADON: CPT

## 2025-02-11 PROCEDURE — 25010000002 VANCOMYCIN 10 G RECONSTITUTED SOLUTION: Performed by: EMERGENCY MEDICINE

## 2025-02-11 PROCEDURE — G0378 HOSPITAL OBSERVATION PER HR: HCPCS

## 2025-02-11 PROCEDURE — 25810000003 SODIUM CHLORIDE 0.9 % SOLUTION: Performed by: EMERGENCY MEDICINE

## 2025-02-11 PROCEDURE — 25010000002 PIPERACILLIN SOD-TAZOBACTAM PER 1 G: Performed by: EMERGENCY MEDICINE

## 2025-02-11 PROCEDURE — 84100 ASSAY OF PHOSPHORUS: CPT | Performed by: INTERNAL MEDICINE

## 2025-02-11 PROCEDURE — 99223 1ST HOSP IP/OBS HIGH 75: CPT | Performed by: INTERNAL MEDICINE

## 2025-02-11 PROCEDURE — 80048 BASIC METABOLIC PNL TOTAL CA: CPT | Performed by: INTERNAL MEDICINE

## 2025-02-11 PROCEDURE — 96368 THER/DIAG CONCURRENT INF: CPT

## 2025-02-11 PROCEDURE — 96366 THER/PROPH/DIAG IV INF ADDON: CPT

## 2025-02-11 PROCEDURE — 96375 TX/PRO/DX INJ NEW DRUG ADDON: CPT

## 2025-02-11 PROCEDURE — 25010000002 PROMETHAZINE PER 50 MG: Performed by: INTERNAL MEDICINE

## 2025-02-11 PROCEDURE — 36415 COLL VENOUS BLD VENIPUNCTURE: CPT

## 2025-02-11 PROCEDURE — 99254 IP/OBS CNSLTJ NEW/EST MOD 60: CPT

## 2025-02-11 PROCEDURE — 96367 TX/PROPH/DG ADDL SEQ IV INF: CPT

## 2025-02-11 PROCEDURE — 87040 BLOOD CULTURE FOR BACTERIA: CPT | Performed by: EMERGENCY MEDICINE

## 2025-02-11 PROCEDURE — 25010000002 CEFTRIAXONE PER 250 MG: Performed by: INTERNAL MEDICINE

## 2025-02-11 PROCEDURE — 25010000002 METOCLOPRAMIDE PER 10 MG: Performed by: EMERGENCY MEDICINE

## 2025-02-11 PROCEDURE — 99212 OFFICE O/P EST SF 10 MIN: CPT

## 2025-02-11 PROCEDURE — 83735 ASSAY OF MAGNESIUM: CPT | Performed by: INTERNAL MEDICINE

## 2025-02-11 PROCEDURE — 74220 X-RAY XM ESOPHAGUS 1CNTRST: CPT

## 2025-02-11 PROCEDURE — 25810000003 LACTATED RINGERS SOLUTION: Performed by: EMERGENCY MEDICINE

## 2025-02-11 PROCEDURE — 25810000003 SODIUM CHLORIDE 0.9 % SOLUTION: Performed by: INTERNAL MEDICINE

## 2025-02-11 PROCEDURE — 25510000002 DIATRIZOATE MEGLUMINE & SODIUM PER 1 ML: Performed by: INTERNAL MEDICINE

## 2025-02-11 PROCEDURE — 25010000002 MORPHINE PER 10 MG: Performed by: INTERNAL MEDICINE

## 2025-02-11 PROCEDURE — 96365 THER/PROPH/DIAG IV INF INIT: CPT

## 2025-02-11 PROCEDURE — 25510000001 IOPAMIDOL 61 % SOLUTION: Performed by: EMERGENCY MEDICINE

## 2025-02-11 PROCEDURE — 25010000002 POTASSIUM CHLORIDE 10 MEQ/100ML SOLUTION

## 2025-02-11 PROCEDURE — 74177 CT ABD & PELVIS W/CONTRAST: CPT

## 2025-02-11 PROCEDURE — 71260 CT THORAX DX C+: CPT

## 2025-02-11 PROCEDURE — 25810000003 SEPSIS FLUID NS 0.9 % SOLUTION: Performed by: EMERGENCY MEDICINE

## 2025-02-11 PROCEDURE — 85025 COMPLETE CBC W/AUTO DIFF WBC: CPT | Performed by: INTERNAL MEDICINE

## 2025-02-11 PROCEDURE — 83605 ASSAY OF LACTIC ACID: CPT | Performed by: EMERGENCY MEDICINE

## 2025-02-11 RX ORDER — IOPAMIDOL 612 MG/ML
75 INJECTION, SOLUTION INTRAVASCULAR
Status: COMPLETED | OUTPATIENT
Start: 2025-02-11 | End: 2025-02-11

## 2025-02-11 RX ORDER — DIATRIZOATE MEGLUMINE AND DIATRIZOATE SODIUM 660; 100 MG/ML; MG/ML
120 SOLUTION ORAL; RECTAL
Status: COMPLETED | OUTPATIENT
Start: 2025-02-11 | End: 2025-02-11

## 2025-02-11 RX ORDER — OLANZAPINE 5 MG/1
5 TABLET, ORALLY DISINTEGRATING ORAL 2 TIMES DAILY PRN
Status: DISCONTINUED | OUTPATIENT
Start: 2025-02-11 | End: 2025-02-13 | Stop reason: HOSPADM

## 2025-02-11 RX ORDER — ONDANSETRON 2 MG/ML
4 INJECTION INTRAMUSCULAR; INTRAVENOUS EVERY 6 HOURS PRN
Status: DISCONTINUED | OUTPATIENT
Start: 2025-02-11 | End: 2025-02-13 | Stop reason: HOSPADM

## 2025-02-11 RX ORDER — SODIUM CHLORIDE 0.9 % (FLUSH) 0.9 %
10 SYRINGE (ML) INJECTION EVERY 12 HOURS SCHEDULED
Status: DISCONTINUED | OUTPATIENT
Start: 2025-02-11 | End: 2025-02-13 | Stop reason: HOSPADM

## 2025-02-11 RX ORDER — OLANZAPINE 5 MG/1
5 TABLET, ORALLY DISINTEGRATING ORAL ONCE
Status: COMPLETED | OUTPATIENT
Start: 2025-02-11 | End: 2025-02-11

## 2025-02-11 RX ORDER — METOCLOPRAMIDE HYDROCHLORIDE 5 MG/ML
10 INJECTION INTRAMUSCULAR; INTRAVENOUS ONCE
Status: COMPLETED | OUTPATIENT
Start: 2025-02-11 | End: 2025-02-11

## 2025-02-11 RX ORDER — BISACODYL 10 MG
10 SUPPOSITORY, RECTAL RECTAL DAILY PRN
Status: DISCONTINUED | OUTPATIENT
Start: 2025-02-11 | End: 2025-02-13 | Stop reason: HOSPADM

## 2025-02-11 RX ORDER — POLYETHYLENE GLYCOL 3350 17 G/17G
17 POWDER, FOR SOLUTION ORAL DAILY PRN
Status: DISCONTINUED | OUTPATIENT
Start: 2025-02-11 | End: 2025-02-13 | Stop reason: HOSPADM

## 2025-02-11 RX ORDER — SODIUM CHLORIDE 0.9 % (FLUSH) 0.9 %
10 SYRINGE (ML) INJECTION AS NEEDED
Status: DISCONTINUED | OUTPATIENT
Start: 2025-02-11 | End: 2025-02-13 | Stop reason: HOSPADM

## 2025-02-11 RX ORDER — NITROGLYCERIN 0.4 MG/1
0.4 TABLET SUBLINGUAL
Status: DISCONTINUED | OUTPATIENT
Start: 2025-02-11 | End: 2025-02-13 | Stop reason: HOSPADM

## 2025-02-11 RX ORDER — NALOXONE HCL 0.4 MG/ML
0.4 VIAL (ML) INJECTION
Status: DISCONTINUED | OUTPATIENT
Start: 2025-02-11 | End: 2025-02-13 | Stop reason: HOSPADM

## 2025-02-11 RX ORDER — SODIUM CHLORIDE 9 MG/ML
100 INJECTION, SOLUTION INTRAVENOUS CONTINUOUS
Status: ACTIVE | OUTPATIENT
Start: 2025-02-11 | End: 2025-02-11

## 2025-02-11 RX ORDER — BISACODYL 5 MG/1
5 TABLET, DELAYED RELEASE ORAL DAILY PRN
Status: DISCONTINUED | OUTPATIENT
Start: 2025-02-11 | End: 2025-02-13 | Stop reason: HOSPADM

## 2025-02-11 RX ORDER — AMOXICILLIN 250 MG
2 CAPSULE ORAL 2 TIMES DAILY PRN
Status: DISCONTINUED | OUTPATIENT
Start: 2025-02-11 | End: 2025-02-13 | Stop reason: HOSPADM

## 2025-02-11 RX ORDER — SODIUM CHLORIDE 9 MG/ML
40 INJECTION, SOLUTION INTRAVENOUS AS NEEDED
Status: DISCONTINUED | OUTPATIENT
Start: 2025-02-11 | End: 2025-02-13 | Stop reason: HOSPADM

## 2025-02-11 RX ORDER — MORPHINE SULFATE 2 MG/ML
1 INJECTION, SOLUTION INTRAMUSCULAR; INTRAVENOUS EVERY 4 HOURS PRN
Status: DISCONTINUED | OUTPATIENT
Start: 2025-02-11 | End: 2025-02-13 | Stop reason: HOSPADM

## 2025-02-11 RX ORDER — IOPAMIDOL 612 MG/ML
100 INJECTION, SOLUTION INTRAVASCULAR
Status: COMPLETED | OUTPATIENT
Start: 2025-02-11 | End: 2025-02-11

## 2025-02-11 RX ORDER — POTASSIUM CHLORIDE 7.45 MG/ML
10 INJECTION INTRAVENOUS
Status: DISPENSED | OUTPATIENT
Start: 2025-02-11 | End: 2025-02-11

## 2025-02-11 RX ORDER — POTASSIUM CHLORIDE 1500 MG/1
40 TABLET, EXTENDED RELEASE ORAL ONCE
Status: DISCONTINUED | OUTPATIENT
Start: 2025-02-11 | End: 2025-02-11

## 2025-02-11 RX ORDER — ALBUTEROL SULFATE 0.83 MG/ML
2.5 SOLUTION RESPIRATORY (INHALATION) EVERY 6 HOURS PRN
Status: DISCONTINUED | OUTPATIENT
Start: 2025-02-11 | End: 2025-02-13 | Stop reason: HOSPADM

## 2025-02-11 RX ORDER — POTASSIUM CHLORIDE 7.45 MG/ML
10 INJECTION INTRAVENOUS ONCE
Status: COMPLETED | OUTPATIENT
Start: 2025-02-11 | End: 2025-02-11

## 2025-02-11 RX ADMIN — SODIUM CHLORIDE 100 ML/HR: 9 INJECTION, SOLUTION INTRAVENOUS at 06:11

## 2025-02-11 RX ADMIN — PROMETHAZINE HYDROCHLORIDE 12.5 MG: 25 INJECTION, SOLUTION INTRAMUSCULAR; INTRAVENOUS at 09:48

## 2025-02-11 RX ADMIN — MORPHINE SULFATE 1 MG: 2 INJECTION, SOLUTION INTRAMUSCULAR; INTRAVENOUS at 12:45

## 2025-02-11 RX ADMIN — VANCOMYCIN HYDROCHLORIDE 2250 MG: 10 INJECTION, POWDER, LYOPHILIZED, FOR SOLUTION INTRAVENOUS at 04:30

## 2025-02-11 RX ADMIN — POTASSIUM CHLORIDE 10 MEQ: 7.46 INJECTION, SOLUTION INTRAVENOUS at 22:14

## 2025-02-11 RX ADMIN — SODIUM CHLORIDE 1000 MG: 900 INJECTION INTRAVENOUS at 08:18

## 2025-02-11 RX ADMIN — SODIUM CHLORIDE 1000 ML: 9 INJECTION, SOLUTION INTRAVENOUS at 04:00

## 2025-02-11 RX ADMIN — Medication 10 ML: at 22:01

## 2025-02-11 RX ADMIN — METOCLOPRAMIDE 10 MG: 5 INJECTION, SOLUTION INTRAMUSCULAR; INTRAVENOUS at 04:30

## 2025-02-11 RX ADMIN — POTASSIUM CHLORIDE 10 MEQ: 7.46 INJECTION, SOLUTION INTRAVENOUS at 16:18

## 2025-02-11 RX ADMIN — DIATRIZOATE MEGLUMINE AND DIATRIZOATE SODIUM 120 ML: 660; 100 LIQUID ORAL; RECTAL at 11:56

## 2025-02-11 RX ADMIN — PROMETHAZINE HYDROCHLORIDE 12.5 MG: 25 INJECTION, SOLUTION INTRAMUSCULAR; INTRAVENOUS at 05:02

## 2025-02-11 RX ADMIN — OLANZAPINE 5 MG: 5 TABLET, ORALLY DISINTEGRATING ORAL at 12:45

## 2025-02-11 RX ADMIN — PIPERACILLIN AND TAZOBACTAM 4.5 G: 4; .5 INJECTION, POWDER, FOR SOLUTION INTRAVENOUS at 04:00

## 2025-02-11 RX ADMIN — IOPAMIDOL 75 ML: 612 INJECTION, SOLUTION INTRAVENOUS at 04:00

## 2025-02-11 RX ADMIN — MORPHINE SULFATE 1 MG: 2 INJECTION, SOLUTION INTRAMUSCULAR; INTRAVENOUS at 07:42

## 2025-02-11 RX ADMIN — POTASSIUM CHLORIDE 10 MEQ: 7.46 INJECTION, SOLUTION INTRAVENOUS at 14:10

## 2025-02-11 RX ADMIN — METOCLOPRAMIDE 10 MG: 5 INJECTION, SOLUTION INTRAMUSCULAR; INTRAVENOUS at 02:02

## 2025-02-11 RX ADMIN — IOPAMIDOL 75 ML: 612 INJECTION, SOLUTION INTRAVENOUS at 02:43

## 2025-02-11 RX ADMIN — Medication 10 ML: at 08:19

## 2025-02-11 RX ADMIN — SODIUM CHLORIDE, POTASSIUM CHLORIDE, SODIUM LACTATE AND CALCIUM CHLORIDE 1000 ML: 600; 310; 30; 20 INJECTION, SOLUTION INTRAVENOUS at 02:30

## 2025-02-11 RX ADMIN — MORPHINE SULFATE 1 MG: 2 INJECTION, SOLUTION INTRAMUSCULAR; INTRAVENOUS at 17:56

## 2025-02-11 RX ADMIN — POTASSIUM CHLORIDE 10 MEQ: 7.46 INJECTION, SOLUTION INTRAVENOUS at 12:52

## 2025-02-11 NOTE — ED PROVIDER NOTES
[facility]    EMERGENCY DEPARTMENT ENCOUNTER      Pt Name: Gorge Canela  MRN: [id number]  YOB: 2005  Date of evaluation: 2/10/2025  Provider: Cecil Hendricks MD    CHIEF COMPLAINT       Chief Complaint   Patient presents with    Vomiting         HISTORY OF PRESENT ILLNESS   Gorge Canela is a 19 y.o. female who presents to the emergency department for evaluation of nausea, vomiting, dehydration.  Patient has been feeling ill for about 4 to 5 days now.  She was initially seen in urgent treatment center, diagnosed with a viral GI illness, given Zofran.  Patient states she has not been able to keep this down and has had worsening symptoms over the past 24 hours and today she noticed some red color to her vomit that she is concerned is bleeding.  She has not had any blood in her stool but she has not had a bowel movement in the past 24 hours.  She has pain in the upper abdomen more prominent on the left side.  No objective fevers but does have chills and shivering.    REVIEW OF SYSTEMS     ROS:  A chief complaint appropriate review of systems was completed and is negative except as noted in the HPI.      PAST MEDICAL HISTORY     Past Medical History:   Diagnosis Date    Allergic rhinitis due to cats     Anxiety     Depression     Itching          SURGICAL HISTORY       Past Surgical History:   Procedure Laterality Date    CYST REMOVAL Left     left arm/wrist         CURRENT MEDICATIONS       Current Facility-Administered Medications:     promethazine (PHENERGAN) 12.5 mg in sodium chloride 0.9 % 50 mL, 12.5 mg, Intravenous, Q6H PRN, Day, Chelo SANDY MD, 12.5 mg at 02/11/25 0502    [COMPLETED] Insert Peripheral IV, , , Once **AND** sodium chloride 0.9 % flush 10 mL, 10 mL, Intravenous, PRN, Cecil Hendricks MD    vancomycin 2250 mg/500 mL 0.9% NS IVPB ([facility]), 20 mg/kg, Intravenous, Once, Cecil Hendricks MD, 2,250 mg at 02/11/25 0430    Current Outpatient Medications:     albuterol sulfate  (90 Base)  MCG/ACT inhaler, Inhale 2 puffs Every 6 (Six) Hours As Needed for Wheezing or Shortness of Air., Disp: 8 g, Rfl: 0    cimetidine (TAGAMET) 300 MG tablet, TAKE 1 TABLET BY MOUTH 4 TIMES A DAY AS NEEDED FOR ITCHING, Disp: 270 tablet, Rfl: 0    colestipol (COLESTID) 1 g tablet, TAKE 1 TABLET BY MOUTH 2 TIMES A DAY, Disp: 180 tablet, Rfl: 0    ondansetron ODT (ZOFRAN-ODT) 4 MG disintegrating tablet, Take 1 tablet by mouth Every 6 (Six) Hours As Needed for Nausea or Vomiting., Disp: 30 tablet, Rfl: 0    Vortioxetine HBr (Trintellix) 10 MG tablet tablet, Take 1 tablet by mouth Daily With Breakfast., Disp: 90 tablet, Rfl: 1    ALLERGIES     Delsym [dextromethorphan] and Lexapro [escitalopram]    FAMILY HISTORY     No family history on file.       SOCIAL HISTORY       Social History     Socioeconomic History    Marital status: Single   Tobacco Use    Smoking status: Never     Passive exposure: Yes    Smokeless tobacco: Never   Vaping Use    Vaping status: Never Used   Substance and Sexual Activity    Alcohol use: Yes    Drug use: Never    Sexual activity: Defer         PHYSICAL EXAM    (up to 7 for level 4, 8 or more for level 5)     Vitals:    02/11/25 0341 02/11/25 0400 02/11/25 0430 02/11/25 0432   BP:  134/73 128/76 128/76   Pulse: 78  85 64   Resp:       Temp:       TempSrc:       SpO2: 98%  93% 100%   Weight:       Height:           Physical Exam  Constitutional:       General: She is not in acute distress.  HENT:      Head: Normocephalic and atraumatic.      Mouth/Throat:      Mouth: Mucous membranes are dry.   Eyes:      Conjunctiva/sclera: Conjunctivae normal.      Pupils: Pupils are equal, round, and reactive to light.   Cardiovascular:      Rate and Rhythm: Normal rate and regular rhythm.      Pulses: Normal pulses.      Heart sounds: No murmur heard.     No gallop.   Pulmonary:      Effort: Pulmonary effort is normal. No respiratory distress.   Abdominal:      General: Abdomen is flat. There is no distension.       Tenderness: There is abdominal tenderness.      Comments: Tender in the left upper quadrant and lesser tenderness in the right upper quadrant.  Nontender in the bilateral lower abdomen   Musculoskeletal:         General: No swelling or deformity. Normal range of motion.   Skin:     General: Skin is warm and dry.      Capillary Refill: Capillary refill takes 2 to 3 seconds.   Neurological:      General: No focal deficit present.      Mental Status: She is alert and oriented to person, place, and time.   Psychiatric:         Mood and Affect: Mood normal.         Behavior: Behavior normal.            DIAGNOSTIC RESULTS     EKG: All EKGs are interpreted by the Emergency Department Physician who either signs or Co-signs this chart in the absence of a cardiologist.    No orders to display         RADIOLOGY:   [x] Radiologist's Report Reviewed:  CT Chest With Contrast Diagnostic   Final Result   Impression:   1.Diffuse pneumomediastinum with air seen dissecting along the base of the face and throughout the neck bilaterally. The esophagus appears grossly unremarkable. No abnormal collection identified. Otherwise, no acute cardiopulmonary process.   2.Ancillary findings as described above.               Electronically Signed: Aleja Kingston MD     2/11/2025 4:05 AM EST     Workstation ID: [id number]      CT Abdomen Pelvis With Contrast   Final Result   Impression:   1.Pneumomediastinum, most pronounced along the distal esophagus. No evidence of free air or air within the upper abdomen. Findings suspicious for esophageal tear given history of vomiting. No acute intra-abdominal or intrapelvic process.   2.Ancillary findings as described above.                  Electronically Signed: Aleja Kingston MD     2/11/2025 2:53 AM EST     Workstation ID: [id number]          I ordered and independently reviewed the above noted radiographic studies.        LABS:  I independently interpreted all laboratory studies conducted during this ED  visit.  The results of these studies can be seen below and my independent interpretation in the ED course      EMERGENCY DEPARTMENT COURSE and DIFFERENTIAL DIAGNOSIS/MDM:   Vitals:  AS OF 05:16 EST    BP - 128/76  HR - 64  TEMP - 97.7 °F (36.5 °C) (Oral)  O2 SATS - 100%        Discussion below represents my analysis of pertinent findings related to patient's condition, differential diagnosis, treatment plan and final disposition.      Differential diagnosis:  The differential diagnosis associated with the patient's presentation includes: Gastritis, gastroesophageal reflux, pancreatitis, hepatitis, acute cholecystitis, choledocholithiasis, viral gastroenteritis      Independent interpretations (ECG/rhythm strip/X-ray/US/CT scan): See ED course      Additional sources:  Discussed/obtained information from independent historians:   [] Spouse:   [x] Parent: Patient's mother provides some details of HPI   [] Friend:   [] EMS:   [] Other:    External record review:  2/6/2025 reviewed urgent treatment center, patient evaluated for fever, body aches, cough, vomiting, diagnosed with viral respiratory illness      Patient's care impacted by:   [] Diabetes   [] Hypertension   [] Coronary Artery Disease   [] Cancer   [x] Other: Obesity    Care significantly affected by Social Determinants of Health (housing and economic circumstances, unemployment)    [] Yes     [x] No   If yes, Patient's care significantly limited by  Social Determinants of Health including:    [] Inadequate housing    [] Low income    [] Alcoholism and drug addiction in family    [] Problems related to primary support group    [] Unemployment    [] Problems related to employment    [] Other Social Determinants of Health:     I considered prescription management with:    [] Pain medication:   [] Antiviral:   [] Antibiotic:   [] Other:    Additional orders considered but not ordered:  The following testing was considered but ultimately not selected: N/A    ED  Course:    ED Course as of 02/11/25 0516   Tue Feb 11, 2025   5275 Dr. Yates, CT surgeon at  [KB]   0510 Laboratory workup independently interpreted by myself demonstrates leukocytosis, pyuria and bacteriuria, mild hypokalemia and mildly elevated creatinine.  Normal lactic acid level [KB]   0511 CT scan of the chest abdomen and pelvis independently interpreted by myself demonstrates significant pneumomediastinum without definitive source for free air, no other intrathoracic or intra-abdominal findings [KB]      ED Course User Index  [KB] Cecil Hendricks MD         Diagnostic lab and imaging studies were conducted.  IV fluids and antiemetics were ordered.    Unfortunately due to ER overcrowding, hospital borders taking up most of the ER beds, as well as lack of nursing and ancillary staff to appropriately care for our patients in the emergency room this patient experienced prolonged delay in her treatment resulting in approximately 5-hour stay in the waiting room.  I was able to intermittently reevaluate the patient and provide some limited care in the waiting room such as administration of antiemetics.  Throughout her ER stay I voiced recommendations to triage nurse that this patient needs a room and she is understanding of that but unfortunately we do not have any beds available for her.    While in the waiting room patient had multiple episodes of vomiting, after 1 particularly severe episode of vomiting patient felt swelling around her neck and in her lower face and noticed some change in her voice.  I evaluated the patient and she has crepitus in the upper chest and neck and lower face.    CT scan was conducted which demonstrates large volume pneumomediastinum tracking into the soft tissues.  Patient was treated with IV fluids, broad-spectrum antibiotics, continued dosing of antiemetics.    She remains hemodynamically stable on multiple assessments in the ER.  Never any hypoxia, hypotension.    I consulted  with CT surgery at our hospital as well as CT surgery at [facility].  Both surgeons recommend that patient is unlikely to require any procedural intervention, recommend supportive care for the pneumomediastinum and close monitoring for any worsening.    Patient to be admitted for further treatment.      PROCEDURES:  Critical Care    Performed by: Cecil Hendricks MD  Authorized by: Cecil Hendricks MD    Critical care provider statement:     Critical care time (minutes):  50    Critical care time was exclusive of:  Separately billable procedures and treating other patients    Critical care was necessary to treat or prevent imminent or life-threatening deterioration of the following conditions:  Sepsis (Management of pneumomediastinum and sepsis from urinary tract infection)    Critical care was time spent personally by me on the following activities:  Development of treatment plan with patient or surrogate, discussions with consultants, evaluation of patient's response to treatment, examination of patient, obtaining history from patient or surrogate, ordering and performing treatments and interventions, ordering and review of laboratory studies, ordering and review of radiographic studies, pulse oximetry, re-evaluation of patient's condition and review of old charts    I assumed direction of critical care for this patient from another provider in my specialty: no      Care discussed with: admitting provider        CRITICAL CARE TIME    50    CONSULTS   Consulted with on-call cardiothoracic surgeon [facility]  Consulted with on-call thoracic surgeon at [facility]  Consulted with hospital medicine for admission    FINAL IMPRESSION      1. Pneumomediastinum    2. Sepsis due to urinary tract infection    3. Vomiting in adult    4. Hypokalemia          DISPOSITION/PLAN     ED Disposition       ED Disposition   Decision to Admit    Condition   --    Comment   --                  Comment: Please note this report has been produced using speech recognition software.      Cecil Hendricks MD  Attending Emergency Physician    Recent Results (from the past 24 hours)   Comprehensive Metabolic Panel    Collection Time: 02/10/25 11:34 PM    Specimen: Blood   Result Value Ref Range    Glucose 129 (H) 65 - 99 mg/dL    BUN 13 6 - 20 mg/dL    Creatinine 1.06 (H) 0.57 - 1.00 mg/dL    Sodium 139 136 - 145 mmol/L    Potassium 3.4 (L) 3.5 - 5.2 mmol/L    Chloride 96 (L) 98 - 107 mmol/L    CO2 24.0 22.0 - 29.0 mmol/L    Calcium 9.7 8.6 - 10.5 mg/dL    Total Protein 8.1 6.0 - 8.5 g/dL    Albumin 4.5 3.5 - 5.2 g/dL    ALT (SGPT) 24 1 - 33 U/L    AST (SGOT) 28 1 - 32 U/L    Alkaline Phosphatase 115 39 - 117 U/L    Total Bilirubin 0.7 0.0 - 1.2 mg/dL    Globulin 3.6 gm/dL    A/G Ratio 1.3 g/dL    BUN/Creatinine Ratio 12.3 7.0 - 25.0    Anion Gap 19.0 (H) 5.0 - 15.0 mmol/L    eGFR 77.3 >60.0 mL/min/1.73   Lipase    Collection Time: 02/10/25 11:34 PM    Specimen: Blood   Result Value Ref Range    Lipase 21 13 - 60 U/L   CBC Auto Differential    Collection Time: 02/10/25 11:34 PM    Specimen: Blood   Result Value Ref Range    WBC 16.07 (H) 3.40 - 10.80 10*3/mm3    RBC 5.48 (H) 3.77 - 5.28 10*6/mm3    Hemoglobin 15.6 12.0 - 15.9 g/dL    Hematocrit 45.6 34.0 - 46.6 %    MCV 83.2 79.0 - 97.0 fL    MCH 28.5 26.6 - 33.0 pg    MCHC 34.2 31.5 - 35.7 g/dL    RDW 12.6 12.3 - 15.4 %    RDW-SD 38.4 37.0 - 54.0 fl    MPV 12.1 (H) 6.0 - 12.0 fL    Platelets 330 140 - 450 10*3/mm3    Neutrophil % 78.9 (H) 42.7 - 76.0 %    Lymphocyte % 12.9 (L) 19.6 - 45.3 %    Monocyte % 7.3 5.0 - 12.0 %    Eosinophil % 0.1 (L) 0.3 - 6.2 %    Basophil % 0.2 0.0 - 1.5 %    Immature Grans % 0.6 (H) 0.0 - 0.5 %    Neutrophils, Absolute 12.68 (H) 1.70 - 7.00 10*3/mm3    Lymphocytes, Absolute 2.07 0.70 - 3.10 10*3/mm3    Monocytes, Absolute 1.17 (H) 0.10 - 0.90 10*3/mm3    Eosinophils, Absolute 0.01 0.00 - 0.40 10*3/mm3    Basophils, Absolute  0.04 0.00 - 0.20 10*3/mm3    Immature Grans, Absolute 0.10 (H) 0.00 - 0.05 10*3/mm3    nRBC 0.0 0.0 - 0.2 /100 WBC   Urinalysis With Microscopic If Indicated (No Culture) - Urine, Clean Catch    Collection Time: 02/10/25 11:38 PM    Specimen: Urine, Clean Catch   Result Value Ref Range    Color, UA Dark Yellow (A) Yellow, Straw    Appearance, UA Turbid (A) Clear    pH, UA 5.5 5.0 - 8.0    Specific Gravity, UA 1.036 (H) 1.001 - 1.030    Glucose, UA Negative Negative    Ketones, UA 15 mg/dL (1+) (A) Negative    Bilirubin, UA Moderate (2+) (A) Negative    Blood, UA Negative Negative    Protein, UA >=300 mg/dL (3+) (A) Negative    Leuk Esterase, UA Trace (A) Negative    Nitrite, UA Positive (A) Negative    Urobilinogen, UA 1.0 E.U./dL 0.2 - 1.0 E.U./dL   Urinalysis, Microscopic Only - Urine, Clean Catch    Collection Time: 02/10/25 11:38 PM    Specimen: Urine, Clean Catch   Result Value Ref Range    RBC, UA 0-2 None Seen, 0-2 /HPF    WBC, UA 21-50 (A) None Seen, 0-2 /HPF    Bacteria, UA 1+ (A) None Seen, Trace /HPF    Squamous Epithelial Cells, UA 7-12 (A) None Seen, 0-2 /HPF    Hyaline Casts, UA Too Numerous to Count 0 - 6 /LPF    WBC Casts 3-6 None Seen /LPF    Methodology Manual Light Microscopy    POC Urine Pregnancy    Collection Time: 02/10/25 11:46 PM    Specimen: Urine   Result Value Ref Range    HCG, Urine, QL Negative Negative    Lot Number [id number]     Internal Positive Control Positive Positive, Passed    Internal Negative Control Negative Negative, Passed    Expiration Date 4/22/26    Lactic Acid, Plasma    Collection Time: 02/11/25  3:49 AM    Specimen: Blood   Result Value Ref Range    Lactate 1.8 0.5 - 2.0 mmol/L     Note: In addition to lab results from this visit, the labs listed above may include labs taken at another facility or during a different encounter within the last 24 hours. Please correlate lab times with ED admission and discharge times for further clarification of the services performed  during this visit.                 Cecil Hendricks MD  02/11/25 0565

## 2025-02-11 NOTE — PROGRESS NOTES
[facility] Medicine Services  ADMISSION FOLLOW-UP NOTE          Patient admitted after midnight, H&P by my partner performed earlier on today's date reviewed.  Interim findings, labs, and charting also reviewed.        The [facility] Hospital Problem List has been managed and updated to include any new diagnoses:  Active Hospital Problems    Diagnosis  POA    **Pneumomediastinum [J98.2]  Yes    Acute UTI (urinary tract infection) [N39.0]  Yes    Nausea and vomiting [R11.2]  Yes    Hypokalemia [E87.6]  Yes      Resolved Hospital Problems   No resolved problems to display.         ADDITIONAL PLAN:  - detailed assessment and plan from admission reviewed  -   Patient seen and examined at bedside. H&P by my partner, Dr Ch, on this same date reviewed. Agree with current plan of care with below updates    18 y/o otherwise fairly healthy female here now w/      Pneumomediastinum  -presumed from forceful vomiting as pt describes hematemesis  -Dr. Sandy with [facility] as well as [facility] notified and both feel pt will not likely need surgical intervention  -nausea control- has had intractable nausea so will trial olanzapine ODT after speaking with pharmacy   -morphine for pain but as d/w pt may intensify the nausea  -NPO for gastrografin study today per [facility]     N/V  -as above  -likely secondary to her UTI    UTI, POA  -tx w/ rocephin   -ucx    Hypokalemia  -replace and check mag         Expected Discharge home   Expected discharge date/ time has not been documented.     Malathi Gastelum MD  02/11/25

## 2025-02-11 NOTE — CONSULTS
[facility] Cardioth[facility] In-Patient Consult    Name:  Gorge Canela  MRN Number:  [id number]  Date of Admission:  2/11/2025  Date of Consultation: 2/11/2025    Consulting Provider:    PCP: Danny Rutledge DO  IP Care Team:  Patient Care Team:  Dnany Rutledge DO as PCP - General (Family Medicine)    Reason for Consultation: Pneumomediastinum    History of Present Illness:    Gorge Canela is a 19 y.o. female with a history of anxiety, depression, and never smoker.  She initially presented to an urgent care on 2/6 for flulike symptoms and was negative for COVID and flu.  She presented to our ER for N/V and hematemesis.  CT scan showed diffuse pneumomediastinum with air seen dissecting along the base of the face and throughout the neck bilaterally suspicious for esophageal tear given history of vomiting. She does report inspiratory chest pain. She is hemodynamically stable on room air.    Review of Systems:  Review of Systems   Cardiovascular:  Positive for chest pain.   Gastrointestinal:  Positive for nausea and vomiting.   All other systems reviewed and are negative.      Hospital Problems:   Pneumomediastinum    Acute UTI (urinary tract infection)    Nausea and vomiting    Hypokalemia       Past Medical History:    Past Medical History:   Diagnosis Date    Allergic rhinitis due to cats     Anxiety     Depression     Itching        Past Surgical History:    Past Surgical History:   Procedure Laterality Date    CYST REMOVAL Left     left arm/wrist       Family History:  No family history on file.    Social History:    Social History     Socioeconomic History    Marital status: Single   Tobacco Use    Smoking status: Never     Passive exposure: Yes    Smokeless tobacco: Never   Vaping Use    Vaping status: Never Used   Substance and Sexual Activity    Alcohol use: Yes    Drug use: Never    Sexual activity: Defer       Allergies:  Allergies   Allergen Reactions    Delsym  [Dextromethorphan] Rash    Lexapro [Escitalopram] Rash         Physical Exam:  Vital Signs:    Temp:  [97.7 °F (36.5 °C)] 97.7 °F (36.5 °C)  Heart Rate:  [64-97] 66  Resp:  [16] 16  BP: (107-136)/(73-87) 124/80  Body mass index is 41.2 kg/m².     Physical Exam  Constitutional:       Appearance: She is obese.   HENT:      Head: Normocephalic.      Mouth/Throat:      Pharynx: Oropharynx is clear.   Eyes:      Pupils: Pupils are equal, round, and reactive to light.   Cardiovascular:      Rate and Rhythm: Normal rate and regular rhythm.      Pulses: Normal pulses.   Pulmonary:      Effort: Pulmonary effort is normal.   Abdominal:      General: Bowel sounds are normal.   Musculoskeletal:         General: Normal range of motion.      Cervical back: Normal range of motion.   Skin:     General: Skin is warm.   Neurological:      General: No focal deficit present.      Mental Status: She is alert and oriented to person, place, and time.   Psychiatric:         Mood and Affect: Mood normal.         Behavior: Behavior normal.         Labs/Imaging/Procedures:   Results from last 7 days   Lab Units 02/11/25  0729 02/10/25  2334   SODIUM mmol/L 141 139   POTASSIUM mmol/L 3.3* 3.4*   CHLORIDE mmol/L 103 96*   CO2 mmol/L 24.0 24.0   BUN mg/dL 9 13   CREATININE mg/dL 0.56* 1.06*   CALCIUM mg/dL 8.6 9.7   BILIRUBIN mg/dL  --  0.7   ALK PHOS U/L  --  115   ALT (SGPT) U/L  --  24   AST (SGOT) U/L  --  28   GLUCOSE mg/dL 111* 129*         Results from last 7 days   Lab Units 02/11/25  0639 02/10/25  2334   WBC 10*3/mm3 6.90 16.07*   HEMOGLOBIN g/dL 12.7 15.6   HEMATOCRIT % 37.0 45.6   PLATELETS 10*3/mm3 243 330         Results from last 7 days   Lab Units 02/11/25  0729   MAGNESIUM mg/dL 2.2         CT Chest With Contrast Diagnostic    Result Date: 2/11/2025  Impression: 1.Diffuse pneumomediastinum with air seen dissecting along the base of the face and throughout the neck bilaterally. The esophagus appears grossly unremarkable. No  abnormal collection identified. Otherwise, no acute cardiopulmonary process. 2.Ancillary findings as described above. Electronically Signed: Aleja Kingston MD  2/11/2025 4:05 AM EST  Workstation ID: [id number]    CT Abdomen Pelvis With Contrast    Result Date: 2/11/2025  Impression: 1.Pneumomediastinum, most pronounced along the distal esophagus. No evidence of free air or air within the upper abdomen. Findings suspicious for esophageal tear given history of vomiting. No acute intra-abdominal or intrapelvic process. 2.Ancillary findings as described above. Electronically Signed: Aleja Kingston MD  2/11/2025 2:53 AM EST  Workstation ID: [id number]        Assessment:    Pneumomediastinum    Acute UTI (urinary tract infection)    Nausea and vomiting    Hypokalemia      Plan:  NPO for Gastrografin study today to evaluate for esophageal perforation given possible tear on CT scan  Morning CXR        Jodi Snow, APRN  10:00 EST  02/11/25     Thank you for allowing us to participate in the care of your patient. Please do not hesitate to contact us with additional questions or concerns.

## 2025-02-11 NOTE — H&P
[facility] Medicine Services  HISTORY AND PHYSICAL    Patient Name: Gorge Canela  : 2005  MRN: [id number]  Primary Care Physician: Danny Rutledge DO  Date of admission: 2025      Subjective   Subjective     Chief Complaint:   N/v    HPI:  Gorge Canela is a 19 y.o. female with hx of anx/depression who presents with c/o n/v primarily.  Pt states 5  days ago she developed runny nose, fever, and cough.  Pt was seen in [facility] on  with negative covid/flu.  Pt was given symptomatic tx.  Pt states her n/v have persisted and now she has had hematemesis today.  Pt states she went to work but sx worsened at end of shift.  Mom notes pt's face is swollen and that voice is more hoarse.  Pt c/o pain with any inspiration and feels a chest heaviness.  Pt still feels nauseated and notes zofran and phenergan do not work well for her.  Has had some dysuria.  Also notes constipation.  No diarrhea.  No leg swelling.       Personal History     Past Medical History:   Diagnosis Date    Allergic rhinitis due to cats     Anxiety     Depression     Itching            Past Surgical History:   Procedure Laterality Date    CYST REMOVAL Left     left arm/wrist       Family History: family history is not on file.     Social History:  reports that she has never smoked. She has been exposed to tobacco smoke. She has never used smokeless tobacco. She reports current alcohol use. She reports that she does not use drugs.  Social History     Social History Narrative    Not on file       Medications:  Available home medication information reviewed.  Vortioxetine HBr, albuterol sulfate HFA, cimetidine, colestipol, and ondansetron ODT    Allergies   Allergen Reactions    Delsym [Dextromethorphan] Rash    Lexapro [Escitalopram] Rash       Objective   Objective     Vital Signs:   Temp:  [97.7 °F (36.5 °C)] 97.7 °F (36.5 °C)  Heart Rate:  [64-97] 64  Resp:  [16] 16  BP: (107-136)/(73-87) 128/76        Physical Exam    Gen; alert, ill appearing, nauseated, sitting upright in bed  Heent; perrla, eomi, mmm, neck w/ palpable crepitus greatest on right neck; no stridor  Cv; rrr, no mrg  L; ctab,no wheeze/crackles  Abd; soft, +bs, ntnd, no r/g  Ext; no cce, palpable pulses  Skin; cdi, wamr  Neuro; grossly intact  Psych; mood and affect appropriate; very pleasant      Result Review:  I have personally reviewed the results from the time of this admission to 2/11/2025 05:26 EST and agree with these findings:  [x]  Laboratory list / accordion  [x]  Microbiology  [x]  Radiology  [x]  EKG/Telemetry   []  Cardiology/Vascular   []  Pathology  []  Old records  []  Other:  Most notable findings include:        LAB RESULTS:      Lab 02/11/25  0349 02/10/25  2334   WBC  --  16.07*   HEMOGLOBIN  --  15.6   HEMATOCRIT  --  45.6   PLATELETS  --  330   NEUTROS ABS  --  12.68*   IMMATURE GRANS (ABS)  --  0.10*   LYMPHS ABS  --  2.07   MONOS ABS  --  1.17*   EOS ABS  --  0.01   MCV  --  83.2   LACTATE 1.8  --          Lab 02/10/25  2334   SODIUM 139   POTASSIUM 3.4*   CHLORIDE 96*   CO2 24.0   ANION GAP 19.0*   BUN 13   CREATININE 1.06*   EGFR 77.3   GLUCOSE 129*   CALCIUM 9.7         Lab 02/10/25  2334   TOTAL PROTEIN 8.1   ALBUMIN 4.5   GLOBULIN 3.6   ALT (SGPT) 24   AST (SGOT) 28   BILIRUBIN 0.7   ALK PHOS 115   LIPASE 21                     UA          4/7/2024    14:32 2/10/2025    23:38   Urinalysis   Squamous Epithelial Cells, UA  7-12    Specific Gravity, UA 1.020  1.036    Ketones, UA Negative  15 mg/dL (1+)    Blood, UA Negative  Negative    Leukocytes, UA Negative  Trace    Nitrite, UA Negative  Positive    RBC, UA  0-2    WBC, UA  21-50    Bacteria, UA  1+        Microbiology Results (last 10 days)       ** No results found for the last 240 hours. **            CT Chest With Contrast Diagnostic    Result Date: 2/11/2025  CT CHEST W CONTRAST DIAGNOSTIC Date of Exam: 2/11/2025 3:53 AM EST Indication: pneumediastinum.  Comparison: For 724. Technique: Axial CT images were obtained of the chest after the uneventful intravenous administration of intravenous contrast.  Reconstructed coronal and sagittal images were also obtained. Automated exposure control and iterative construction methods were used. Findings: Hilum and Mediastinum: No pathologically enlarged lymph nodes.  Normal heart size.   No pericardial effusion.  Unremarkable thoracic aorta and pulmonary arteries. There is diffuse pneumomediastinum present with air seen dissecting along the base of the face and throughout the neck bilaterally. The esophagus appears grossly unremarkable. No abnormal collection identified. Bronchi appear grossly unremarkable. Lung Parenchyma and Pleura: No focal consolidations.  No suspicious pulmonary nodules.  No endobronchial lesions.  No significant pleural effusions. Upper Abdomen: No acute process. Soft tissues: Unremarkable. Osseous structures: No aggressive focal lytic or sclerotic osseous lesions.     Impression: Impression: 1.Diffuse pneumomediastinum with air seen dissecting along the base of the face and throughout the neck bilaterally. The esophagus appears grossly unremarkable. No abnormal collection identified. Otherwise, no acute cardiopulmonary process. 2.Ancillary findings as described above. Electronically Signed: Aleja Kingston MD  2/11/2025 4:05 AM EST  Workstation ID: [id number]    CT Abdomen Pelvis With Contrast    Result Date: 2/11/2025  CT ABDOMEN PELVIS W CONTRAST Date of Exam: 2/11/2025 2:42 AM EST Indication: Upper abdominal pain, nausea and vomiting. Comparison: 4/25/2024, 11/20/2023. Technique: Axial CT images were obtained of the abdomen and pelvis following the uneventful intravenous administration intravenous contrast reconstructed coronal and sagittal images were also obtained. Automated exposure control and iterative construction methods were used. Findings: Lung Bases:   The visualized lung bases and lower  mediastinal structures demonstrate air along the mediastinum, most pronounced along the distal esophagus. A small amount of air seen extending within the anterior mediastinum. No evidence of free air or air within the upper abdomen. Liver: The liver appears hypodense consistent with steatosis.. No focal lesions. Biliary/Gallbladder:  The gallbladder is normal without evidence of radiopaque stones. The biliary tree is nondilated. Spleen: Spleen is normal in size and CT density. Pancreas:  Pancreas is normal. There is no evidence of pancreatic mass or peripancreatic fluid. Kidneys:  Kidneys are normal in size. There are no stones or hydronephrosis. Adrenals:  Adrenal glands are unremarkable. Retroperitoneal/Lymph Nodes/Vasculature:  No retroperitoneal adenopathy is identified. Gastrointestinal/Mesentery:  The bowel loops are non-dilated without wall thickening or mass. The appendix appears within normal limits. No evidence of obstruction. No free air. No mesenteric fluid collections identified. No significant stool burden. No evidence of hernia. Bladder:  The bladder is normal. Genital:   Unremarkable       Bony Structures:   Visualized bony structures are consistent with the patient's age.     Impression: Impression: 1.Pneumomediastinum, most pronounced along the distal esophagus. No evidence of free air or air within the upper abdomen. Findings suspicious for esophageal tear given history of vomiting. No acute intra-abdominal or intrapelvic process. 2.Ancillary findings as described above. Electronically Signed: Aleja Kingston MD  2/11/2025 2:53 AM EST  Workstation ID: [id number]         Assessment & Plan   Assessment & Plan       Pneumomediastinum    Acute UTI (urinary tract infection)    Nausea and vomiting    Hypokalemia      20 y/o otherwise fairly healthy female here now w/  Pneumomediastinum  -presumed from forceful vomiting as pt describes hematemesis  -Dr. Sandy with [facility] as well as [facility] notified and both  feel pt will not likely need surgical intervention  -nausea control  -morphine for pain but as d/w pt may intensify the nausea  2. N/V  -as above  -likely secondary to her UTI  3. UTI, POA  -tx w/ rocephin   -ucx  4. Hypokalemia  -replace and check mag                VTE Prophylaxis:  Mechanical VTE prophylaxis orders are present.          CODE STATUS:    There are no questions and answers to display.       Expected Discharge   Expected discharge date/ time has not been documented.     Chelo Ch MD  02/11/25  Electronically signed by Chelo Ch MD, 02/11/25, 5:30 AM EST.     ALL (acute lymphoblastic leukemia)    Asthma    Constipation    Fracture of clavicle  left clavicle fracture  Headache    Language barrier    RAD (reactive airway disease), mild intermittent, uncomplicated

## 2025-02-11 NOTE — CASE MANAGEMENT/SOCIAL WORK
Discharge Planning Assessment  [facility]     Patient Name: Gorge Canela  MRN: [id number]  Today's Date: 2/11/2025    Admit Date: 2/11/2025    Plan: IDP   Discharge Needs Assessment       Row Name 02/11/25 1133       Living Environment    People in Home parent(s)    Name(s) of People in Home Patito Golden    Current Living Arrangements home    Potentially Unsafe Housing Conditions unable to assess    In the past 12 months has the electric, gas, oil, or water company threatened to shut off services in your home? Pt Unable    Primary Care Provided by self    Provides Primary Care For no one    Family Caregiver if Needed parent(s)    Family Caregiver Names Patito Golden    Quality of Family Relationships helpful;involved    Able to Return to Prior Arrangements yes       Resource/Environmental Concerns    Resource/Environmental Concerns none    Transportation Concerns none       Transportation Needs    In the past 12 months, has lack of transportation kept you from medical appointments or from getting medications? Pt Unable    In the past 12 months, has lack of transportation kept you from meetings, work, or from getting things needed for daily living? Pt Unable       Food Insecurity    Within the past 12 months, you worried that your food would run out before you got the money to buy more. Pt Unable    Within the past 12 months, the food you bought just didn't last and you didn't have money to get more. Pt Unable       Transition Planning    Patient/Family Anticipates Transition to home with family    Transportation Anticipated family or friend will provide       Discharge Needs Assessment    Readmission Within the Last 30 Days no previous admission in last 30 days    Equipment Currently Used at Home none    Do you want help finding or keeping work or a job? Patient unable to answer    Do you want help with school or training? For example, starting or completing job training or getting a high school diploma, GED or  equivalent Patient unable to answer                   Discharge Plan       Row Name 02/11/25 1134       Plan    Plan IDP    Plan Comments MSW met with pt. and pt.'s mother at bedside. Pt. was asleep during visit. Pt. lives with her mother in [facility]. Pt.'s PCP is Danny Rutledge. Pt.'s pharmacy is [facility]. Pt.'s insurance is [employer]. Pt. is independent at baseline. Pt.'s mother denies DME, O2, and HH currently. Pt. has transportation back home when medically ready to d/c. Pt. doesn't have an advanced directive or ACP documentation on file. CM will continue to follow pt. throughout her stay.                  Continued Care and Services - Admitted Since 2/11/2025    No active coordination exists for this encounter.          Demographic Summary       Row Name 02/11/25 1139       General Information    Admission Type observation    Arrived From home    Referral Source admission list;emergency department    Reason for Consult discharge planning    Preferred Language English                   Functional Status       Row Name 02/11/25 1138       Physical Activity    On average, how many days per week do you engage in moderate to strenuous exercise (like a brisk walk)? Pt Unable    On average, how many minutes do you engage in exercise at this level? Pt Unable       Functional Status, IADL    Medications independent    Meal Preparation independent    Housekeeping independent    Laundry independent    Shopping independent    If for any reason you need help with day-to-day activities such as bathing, preparing meals, shopping, managing finances, etc., do you get the help you need? Patient unable to answer       Mental Status Summary    Recent Changes in Mental Status/Cognitive Functioning unable to assess       Employment/    Employment Status employed part-time                   Psychosocial       Row Name 02/11/25 1130       Mental Health    Why did the patient not complete the Depression  Screen questions? Patient unable to answer       Stress    Do you feel stress - tense, restless, nervous, or anxious, or unable to sleep at night because your mind is troubled all the time - these days? Pt Unable                   Abuse/Neglect    No documentation.                  Legal       Row Name 02/11/25 1133       Financial Resource Strain    How hard is it for you to pay for the very basics like food, housing, medical care, and heating? Pt Unable                   Substance Abuse    No documentation.                  Patient Forms    No documentation.                     SANDY Shell

## 2025-02-11 NOTE — ED NOTES
Gorge Canela    Nursing Report ED to Floor:  Mental status: alert and oriented  Ambulatory status: independent  Oxygen Therapy:  room air  Cardiac Rhythm: normal sinus rhythm  Admitted from: ED  Safety Concerns:  None noted  Precautions: None   Social Issues: None noted  ED Room #:  20    ED Nurse Phone Extension - [phone] or may call [phone].      HPI:   Chief Complaint   Patient presents with    Vomiting       Past Medical History:  Past Medical History:   Diagnosis Date    Allergic rhinitis due to cats     Anxiety     Depression     Itching         Past Surgical History:  Past Surgical History:   Procedure Laterality Date    CYST REMOVAL Left     left arm/wrist        Admitting Doctor:   Malathi Gastelum MD    Consulting Provider(s):  Consults       Date and Time Order Name Status Description    2/11/2025  5:19 AM Inpatient Cardiothoracic Surgery Consult Completed              Admitting Diagnosis:   The primary encounter diagnosis was Sepsis due to urinary tract infection. Diagnoses of Pneumomediastinum, Vomiting in adult, and Hypokalemia were also pertinent to this visit.    Most Recent Vitals:   Vitals:    02/11/25 1105 02/11/25 1110 02/11/25 1112 02/11/25 1115   BP:       Pulse: 64 67 92 59   Resp:       Temp:       TempSrc:       SpO2:  95% 95% 96%   Weight:       Height:           Active LDAs/IV Access:   Lines, Drains & Airways       Active LDAs       Name Placement date Placement time Site Days    Peripheral IV 02/10/25 2334 Left Antecubital 02/10/25  2334  Antecubital  less than 1    Peripheral IV 02/11/25 0353 Right Antecubital 02/11/25  0353  Antecubital  less than 1                    Labs (abnormal labs have a star):   Labs Reviewed   COMPREHENSIVE METABOLIC PANEL - Abnormal; Notable for the following components:       Result Value    Glucose 129 (*)     Creatinine 1.06 (*)     Potassium 3.4 (*)     Chloride 96 (*)     Anion Gap 19.0 (*)     All other components within normal limits    Narrative:      GFR Categories in Chronic Kidney Disease (CKD)      GFR Category          GFR (mL/min/1.73)    Interpretation  G1                     90 or greater         Normal or high (1)  G2                      60-89                Mild decrease (1)  G3a                   45-59                Mild to moderate decrease  G3b                   30-44                Moderate to severe decrease  G4                    15-29                Severe decrease  G5                    14 or less           Kidney failure          (1)In the absence of evidence of kidney disease, neither GFR category G1 or G2 fulfill the criteria for CKD.    eGFR calculation 2021 CKD-EPI creatinine equation, which does not include race as a factor   URINALYSIS W/ MICROSCOPIC IF INDICATED (NO CULTURE) - Abnormal; Notable for the following components:    Color, UA Dark Yellow (*)     Appearance, UA Turbid (*)     Specific Gravity, UA 1.036 (*)     Ketones, UA 15 mg/dL (1+) (*)     Bilirubin, UA Moderate (2+) (*)     Protein, UA >=300 mg/dL (3+) (*)     Leuk Esterase, UA Trace (*)     Nitrite, UA Positive (*)     All other components within normal limits   CBC WITH AUTO DIFFERENTIAL - Abnormal; Notable for the following components:    WBC 16.07 (*)     RBC 5.48 (*)     MPV 12.1 (*)     Neutrophil % 78.9 (*)     Lymphocyte % 12.9 (*)     Eosinophil % 0.1 (*)     Immature Grans % 0.6 (*)     Neutrophils, Absolute 12.68 (*)     Monocytes, Absolute 1.17 (*)     Immature Grans, Absolute 0.10 (*)     All other components within normal limits   URINALYSIS, MICROSCOPIC ONLY - Abnormal; Notable for the following components:    WBC, UA 21-50 (*)     Bacteria, UA 1+ (*)     Squamous Epithelial Cells, UA 7-12 (*)     All other components within normal limits   CBC WITH AUTO DIFFERENTIAL - Abnormal; Notable for the following components:    MPV 12.7 (*)     Lymphocyte % 15.7 (*)     Eosinophil % 0.0 (*)     All other components within normal limits   BASIC METABOLIC PANEL -  Abnormal; Notable for the following components:    Glucose 111 (*)     Creatinine 0.56 (*)     Potassium 3.3 (*)     All other components within normal limits    Narrative:     GFR Categories in Chronic Kidney Disease (CKD)      GFR Category          GFR (mL/min/1.73)    Interpretation  G1                     90 or greater         Normal or high (1)  G2                      60-89                Mild decrease (1)  G3a                   45-59                Mild to moderate decrease  G3b                   30-44                Moderate to severe decrease  G4                    15-29                Severe decrease  G5                    14 or less           Kidney failure          (1)In the absence of evidence of kidney disease, neither GFR category G1 or G2 fulfill the criteria for CKD.    eGFR calculation 2021 CKD-EPI creatinine equation, which does not include race as a factor   LIPASE - Normal   LACTIC ACID, PLASMA - Normal   MAGNESIUM - Normal   PHOSPHORUS - Normal   POCT PEFORM URINE PREGNANCY - Normal   URINE CULTURE   BLOOD CULTURE   BLOOD CULTURE   CBC AND DIFFERENTIAL    Narrative:     The following orders were created for panel order CBC & Differential.  Procedure                               Abnormality         Status                     ---------                               -----------         ------                     CBC Auto Differential[[id number]]        Abnormal            Final result                 Please view results for these tests on the individual orders.       Meds Given in ED:   Medications   sodium chloride 0.9 % flush 10 mL (has no administration in time range)   albuterol (PROVENTIL) nebulizer solution 0.083% 2.5 mg/3mL (has no administration in time range)   sodium chloride 0.9 % flush 10 mL (10 mL Intravenous Given 2/11/25 0819)   sodium chloride 0.9 % flush 10 mL (has no administration in time range)   sodium chloride 0.9 % infusion 40 mL (has no administration in time range)    nitroglycerin (NITROSTAT) SL tablet 0.4 mg (has no administration in time range)   sodium chloride 0.9 % infusion (100 mL/hr Intravenous Currently Infusing 2/11/25 1018)   Potassium Replacement - Follow Nurse / BPA Driven Protocol (has no administration in time range)   Magnesium Standard Dose Replacement - Follow Nurse / BPA Driven Protocol (has no administration in time range)   Phosphorus Replacement - Follow Nurse / BPA Driven Protocol (has no administration in time range)   Calcium Replacement - Follow Nurse / BPA Driven Protocol (has no administration in time range)   morphine injection 1 mg (1 mg Intravenous Given 2/11/25 0742)     And   naloxone (NARCAN) injection 0.4 mg (has no administration in time range)   sennosides-docusate (PERICOLACE) 8.6-50 MG per tablet 2 tablet (has no administration in time range)     And   polyethylene glycol (MIRALAX) packet 17 g (has no administration in time range)     And   bisacodyl (DULCOLAX) EC tablet 5 mg (has no administration in time range)     And   bisacodyl (DULCOLAX) suppository 10 mg (has no administration in time range)   ondansetron (ZOFRAN) injection 4 mg (has no administration in time range)   cefTRIAXone (ROCEPHIN) 1,000 mg in sodium chloride 0.9 % 100 mL MBP (0 mg Intravenous Stopped 2/11/25 0920)   potassium chloride (KLOR-CON M20) CR tablet 40 mEq (0 mEq Oral Hold 2/11/25 0742)   promethazine (PHENERGAN) 12.5 mg in sodium chloride 0.9 % 50 mL ( Intravenous Currently Infusing 2/11/25 1148)   lactated ringers bolus 1,000 mL (0 mL Intravenous Stopped 2/11/25 0428)   ondansetron (ZOFRAN) injection 4 mg (4 mg Intravenous Given 2/10/25 2336)   dicyclomine (BENTYL) injection 20 mg (20 mg Intramuscular Given 2/10/25 2342)   metoclopramide (REGLAN) injection 10 mg (10 mg Intravenous Given 2/11/25 0202)   iopamidol (ISOVUE-300) 61 % injection 75 mL (75 mL Intravenous Given 2/11/25 0243)   vancomycin 2250 mg/500 mL 0.9% NS IVPB ([facility]) (0 mg Intravenous Stopped  2/11/25 0818)   piperacillin-tazobactam (ZOSYN) 4.5 g IVPB in 100 mL NS MBP (CD) (0 g Intravenous Stopped 2/11/25 0428)   sepsis fluid NS 0.9 % bolus 1,000 mL (1,000 mL Intravenous New Bag 2/11/25 0400)   iopamidol (ISOVUE-300) 61 % injection 100 mL (75 mL Intravenous Given 2/11/25 0400)   metoclopramide (REGLAN) injection 10 mg (10 mg Intravenous Given 2/11/25 0430)     sodium chloride, 100 mL/hr, Last Rate: 100 mL/hr (02/11/25 1018)         Last NIH score:                                                          Dysphagia screening results:  Patient Factors Component (Dysphagia:Stroke or Rule-out)  Best Eye Response: 4-->(E4) spontaneous (02/11/25 0515)  Best Motor Response: 6-->(M6) obeys commands (02/11/25 0515)  Best Verbal Response: 5-->(V5) oriented (02/11/25 0515)  Ramón Coma Scale Score: 15 (02/11/25 0515)     Ramón Coma Scale:  No data recorded     CIWA:        Restraint Type:            Isolation Status:  No active isolations

## 2025-02-12 ENCOUNTER — APPOINTMENT (OUTPATIENT)
Dept: GENERAL RADIOLOGY | Facility: HOSPITAL | Age: 20
End: 2025-02-12
Payer: COMMERCIAL

## 2025-02-12 PROBLEM — R11.2 NAUSEA AND VOMITING: Status: RESOLVED | Noted: 2025-02-11 | Resolved: 2025-02-12

## 2025-02-12 PROBLEM — N39.0 ACUTE UTI (URINARY TRACT INFECTION): Status: RESOLVED | Noted: 2025-02-11 | Resolved: 2025-02-12

## 2025-02-12 LAB — BACTERIA SPEC AEROBE CULT: NORMAL

## 2025-02-12 PROCEDURE — 25010000002 CEFTRIAXONE PER 250 MG: Performed by: INTERNAL MEDICINE

## 2025-02-12 PROCEDURE — G0378 HOSPITAL OBSERVATION PER HR: HCPCS

## 2025-02-12 PROCEDURE — 25010000002 MORPHINE PER 10 MG: Performed by: INTERNAL MEDICINE

## 2025-02-12 PROCEDURE — 99232 SBSQ HOSP IP/OBS MODERATE 35: CPT | Performed by: THORACIC SURGERY (CARDIOTHORACIC VASCULAR SURGERY)

## 2025-02-12 PROCEDURE — 99232 SBSQ HOSP IP/OBS MODERATE 35: CPT | Performed by: INTERNAL MEDICINE

## 2025-02-12 PROCEDURE — 71045 X-RAY EXAM CHEST 1 VIEW: CPT

## 2025-02-12 PROCEDURE — 96376 TX/PRO/DX INJ SAME DRUG ADON: CPT

## 2025-02-12 RX ORDER — PROMETHAZINE HYDROCHLORIDE 25 MG/1
25 TABLET ORAL EVERY 6 HOURS PRN
Qty: 30 TABLET | Refills: 0 | Status: SHIPPED | OUTPATIENT
Start: 2025-02-12

## 2025-02-12 RX ORDER — ONDANSETRON 4 MG/1
4 TABLET, ORALLY DISINTEGRATING ORAL EVERY 6 HOURS PRN
Qty: 30 TABLET | Refills: 1 | Status: SHIPPED | OUTPATIENT
Start: 2025-02-12 | End: 2025-02-13 | Stop reason: HOSPADM

## 2025-02-12 RX ADMIN — Medication 10 ML: at 09:15

## 2025-02-12 RX ADMIN — MORPHINE SULFATE 1 MG: 2 INJECTION, SOLUTION INTRAMUSCULAR; INTRAVENOUS at 21:35

## 2025-02-12 RX ADMIN — MORPHINE SULFATE 1 MG: 2 INJECTION, SOLUTION INTRAMUSCULAR; INTRAVENOUS at 17:11

## 2025-02-12 RX ADMIN — SODIUM CHLORIDE 1000 MG: 900 INJECTION INTRAVENOUS at 09:14

## 2025-02-12 RX ADMIN — Medication 10 ML: at 21:36

## 2025-02-12 NOTE — PROGRESS NOTES
CTS Progress Note       LOS: 0 days   Patient Care Team:  Danny Rutledge DO as PCP - General (Family Medicine)    Chief Complaint: Pneumomediastinum    Vital Signs:  Temp:  [97.7 °F (36.5 °C)-98.3 °F (36.8 °C)] 97.8 °F (36.6 °C)  Heart Rate:  [51-92] 51  Resp:  [16-18] 16  BP: (106-129)/(60-91) 120/72    Physical Exam:  Breathing unlabored     Results:     Results from last 7 days   Lab Units 02/11/25  0639   WBC 10*3/mm3 6.90   HEMOGLOBIN g/dL 12.7   HEMATOCRIT % 37.0   PLATELETS 10*3/mm3 243     Results from last 7 days   Lab Units 02/11/25  0729   SODIUM mmol/L 141   POTASSIUM mmol/L 3.3*   CHLORIDE mmol/L 103   CO2 mmol/L 24.0   BUN mg/dL 9   CREATININE mg/dL 0.56*   GLUCOSE mg/dL 111*   CALCIUM mg/dL 8.6           Imaging Results (Last 24 Hours)       Procedure Component Value Units Date/Time    XR Chest 1 View [[id number]] Collected: 02/12/25 0640     Updated: 02/12/25 0645    Narrative:      XR CHEST 1 VW    Date of Exam: 2/12/2025 12:19 AM EST    Indication: Pneumomediastinum    Comparison: CT chest 2/11/2025, chest x-ray 4/7/2024    Findings:  There is pneumomediastinum with gas tracking into the base of the neck. No significant pneumothorax. Heart size normal. Lungs are clear.      Impression:      Impression:  Pneumomediastinum is unchanged from CT chest 2/11/2025.      Electronically Signed: Malena Brown MD    2/12/2025 6:42 AM EST    Workstation ID: [id number]    FL ESOPHAGRAM SINGLE CONTRAST [[id number]] Collected: 02/11/25 1211     Updated: 02/11/25 1219    Narrative:      FL ESOPHAGRAM SINGLE CONTRAST    Date of Exam: 2/11/2025 11:00 AM EST    Indication: Questionable esophageal perforation.       Comparison: None available.    Technique:   imaging was obtained. The patient ingested water soluble contrast for fluoroscopic guided imaging of the esophagus.    Fluoroscopic Time: 1 minute    Number of Images: 1  image and 5 fluoroscopic loops    Findings:  Slightly limited examination due to  patient condition and inability to ingest large volumes of fluid.    Pre-procedure radiograph: Bowel gas is present in a nonobstructive pattern. There is no visible pneumoperitoneum, abnormal abdominal calcification, or abnormal mass.    Swallow: Normal. No aspiration.    Esophagus: No evidence of esophageal leak.    Stomach: Contrast passes easily into a normal-appearing stomach.    Other: Extensive pneumomediastinum and gas seen within the soft tissues of the neck.      Impression:      Impression:  No evidence of esophageal leak. Exam was slightly limited due to patient condition and inability to ingest large volumes of fluid. Exam was only done with water-soluble contrast.      Electronically Signed: Nick Sarah MD    2/11/2025 12:15 PM EST    Workstation ID: [id number]            Assessment      Pneumomediastinum    Acute UTI (urinary tract infection)    Nausea and vomiting    Hypokalemia    No fevers no evidence of leak on Gastrografin swallow.  Will follow along but patient could probably be discharged anytime from our standpoint    Plan   As above    Please note that portions of this note were completed with a voice recognition program. Efforts were made to edit the dictations, but occasionally words are mistranscribed.    Ashu Sandy MD  02/12/25  08:20 EST

## 2025-02-12 NOTE — PLAN OF CARE
Goal Outcome Evaluation:              Outcome Evaluation: Pt on RA, NSR-SB on tele, VSS. No complaints of pain or N/V this shift. No acute changes at this time. Will continue with POC.

## 2025-02-12 NOTE — CASE MANAGEMENT/SOCIAL WORK
Continued Stay Note  [facility]     Patient Name: Gorge Canela  MRN: [id number]  Today's Date: 2/12/2025    Admit Date: 2/11/2025    Plan: Home   Discharge Plan       Row Name 02/12/25 1334       Plan    Plan Home    Plan Comments Per discussion in MDR, Pt has been having diarrhea. She is receiving IV Abx and is on room air. I spoke with Pt and mother, in room. Pt c/o dysphagia but denies nausea. She reports MD told her she would be staying another night. Plan is home at discharge. CM will continue to follow for medical readiness.    Final Discharge Disposition Code 01 - home or self-care                   Discharge Codes    No documentation.                 Expected Discharge Date and Time       Expected Discharge Date Expected Discharge Time    Feb 12, 2025               Aby Cruz RN

## 2025-02-12 NOTE — PROGRESS NOTES
[facility]  PROGRESS NOTE    Patient Name: Gorge Canela  : 2005  MRN: [id number]    Date of Admission: 2025  Primary Care Physician: Danny Rutledge DO    Subjective   Subjective     CC: f/u n/v    HPI: Asleep on my arrival. Mother in room. Patient rested all night without vomiting following zyprexa administration. Easily woke up and said she felt pretty well aside from facial/chest swelling.      Objective   Objective     Vital Signs:   Temp:  [97.7 °F (36.5 °C)-98.3 °F (36.8 °C)] 97.8 °F (36.6 °C)  Heart Rate:  [51-92] 51  Resp:  [16-18] 16  BP: (106-129)/(60-91) 120/72     Physical Exam:  Constitutional: No acute distress, awake, alert  HENT: NCAT, mucous membranes moist  Chest: Some crepitus  Respiratory: Clear to auscultation bilaterally, respiratory effort normal   Cardiovascular: RRR, no murmurs, rubs, or gallops  Gastrointestinal: Positive bowel sounds, soft, nontender, nondistended  Musculoskeletal: No bilateral ankle edema  Psychiatric: Appropriate affect, cooperative  Neurologic: Oriented x 3, strength symmetric in all extremities, Cranial Nerves grossly intact to confrontation, speech clear  Skin: No rashes     Results Reviewed:  LAB RESULTS:      Lab 25  0639 25  0349 02/10/25  2334   WBC 6.90  --  16.07*   HEMOGLOBIN 12.7  --  15.6   HEMATOCRIT 37.0  --  45.6   PLATELETS 243  --  330   NEUTROS ABS 5.14  --  12.68*   IMMATURE GRANS (ABS) 0.01  --  0.10*   LYMPHS ABS 1.08  --  2.07   MONOS ABS 0.67  --  1.17*   EOS ABS 0.00  --  0.01   MCV 84.1  --  83.2   LACTATE  --  1.8  --          Lab 25  0729 02/10/25  2334   SODIUM 141 139   POTASSIUM 3.3* 3.4*   CHLORIDE 103 96*   CO2 24.0 24.0   ANION GAP 14.0 19.0*   BUN 9 13   CREATININE 0.56* 1.06*   EGFR 134.2 77.3   GLUCOSE 111* 129*   CALCIUM 8.6 9.7   MAGNESIUM 2.2  --    PHOSPHORUS 4.0  --          Lab 02/10/25  2334   TOTAL PROTEIN 8.1   ALBUMIN 4.5   GLOBULIN 3.6   ALT  (SGPT) 24   AST (SGOT) 28   BILIRUBIN 0.7   ALK PHOS 115   LIPASE 21                     Brief Urine Lab Results  (Last result in the past 365 days)        Color   Clarity   Blood   Leuk Est   Nitrite   Protein   CREAT   Urine HCG        02/10/25 2346               Negative               Microbiology Results Abnormal       None            XR Chest 1 View    Result Date: 2/12/2025  XR CHEST 1 VW Date of Exam: 2/12/2025 12:19 AM EST Indication: Pneumomediastinum Comparison: CT chest 2/11/2025, chest x-ray 4/7/2024 Findings: There is pneumomediastinum with gas tracking into the base of the neck. No significant pneumothorax. Heart size normal. Lungs are clear.     Impression: Impression: Pneumomediastinum is unchanged from CT chest 2/11/2025. Electronically Signed: Malena Brown MD  2/12/2025 6:42 AM EST  Workstation ID: [id number]    FL ESOPHAGRAM SINGLE CONTRAST    Result Date: 2/11/2025  FL ESOPHAGRAM SINGLE CONTRAST Date of Exam: 2/11/2025 11:00 AM EST Indication: Questionable esophageal perforation.   Comparison: None available. Technique:   imaging was obtained. The patient ingested water soluble contrast for fluoroscopic guided imaging of the esophagus. Fluoroscopic Time: 1 minute Number of Images: 1  image and 5 fluoroscopic loops Findings: Slightly limited examination due to patient condition and inability to ingest large volumes of fluid. Pre-procedure radiograph: Bowel gas is present in a nonobstructive pattern. There is no visible pneumoperitoneum, abnormal abdominal calcification, or abnormal mass. Swallow: Normal. No aspiration. Esophagus: No evidence of esophageal leak. Stomach: Contrast passes easily into a normal-appearing stomach. Other: Extensive pneumomediastinum and gas seen within the soft tissues of the neck.     Impression: Impression: No evidence of esophageal leak. Exam was slightly limited due to patient condition and inability to ingest large volumes of fluid. Exam was only done with  water-soluble contrast. Electronically Signed: Nick Sarah MD  2/11/2025 12:15 PM EST  Workstation ID: [id number]    CT Chest With Contrast Diagnostic    Result Date: 2/11/2025  CT CHEST W CONTRAST DIAGNOSTIC Date of Exam: 2/11/2025 3:53 AM EST Indication: pneumediastinum. Comparison: For 724. Technique: Axial CT images were obtained of the chest after the uneventful intravenous administration of intravenous contrast.  Reconstructed coronal and sagittal images were also obtained. Automated exposure control and iterative construction methods were used. Findings: Hilum and Mediastinum: No pathologically enlarged lymph nodes.  Normal heart size.   No pericardial effusion.  Unremarkable thoracic aorta and pulmonary arteries. There is diffuse pneumomediastinum present with air seen dissecting along the base of the face and throughout the neck bilaterally. The esophagus appears grossly unremarkable. No abnormal collection identified. Bronchi appear grossly unremarkable. Lung Parenchyma and Pleura: No focal consolidations.  No suspicious pulmonary nodules.  No endobronchial lesions.  No significant pleural effusions. Upper Abdomen: No acute process. Soft tissues: Unremarkable. Osseous structures: No aggressive focal lytic or sclerotic osseous lesions.     Impression: Impression: 1.Diffuse pneumomediastinum with air seen dissecting along the base of the face and throughout the neck bilaterally. The esophagus appears grossly unremarkable. No abnormal collection identified. Otherwise, no acute cardiopulmonary process. 2.Ancillary findings as described above. Electronically Signed: Aleja Kingston MD  2/11/2025 4:05 AM EST  Workstation ID: [id number]    CT Abdomen Pelvis With Contrast    Result Date: 2/11/2025  CT ABDOMEN PELVIS W CONTRAST Date of Exam: 2/11/2025 2:42 AM EST Indication: Upper abdominal pain, nausea and vomiting. Comparison: 4/25/2024, 11/20/2023. Technique: Axial CT images were obtained of the abdomen and  pelvis following the uneventful intravenous administration intravenous contrast reconstructed coronal and sagittal images were also obtained. Automated exposure control and iterative construction methods were used. Findings: Lung Bases:   The visualized lung bases and lower mediastinal structures demonstrate air along the mediastinum, most pronounced along the distal esophagus. A small amount of air seen extending within the anterior mediastinum. No evidence of free air or air within the upper abdomen. Liver: The liver appears hypodense consistent with steatosis.. No focal lesions. Biliary/Gallbladder:  The gallbladder is normal without evidence of radiopaque stones. The biliary tree is nondilated. Spleen: Spleen is normal in size and CT density. Pancreas:  Pancreas is normal. There is no evidence of pancreatic mass or peripancreatic fluid. Kidneys:  Kidneys are normal in size. There are no stones or hydronephrosis. Adrenals:  Adrenal glands are unremarkable. Retroperitoneal/Lymph Nodes/Vasculature:  No retroperitoneal adenopathy is identified. Gastrointestinal/Mesentery:  The bowel loops are non-dilated without wall thickening or mass. The appendix appears within normal limits. No evidence of obstruction. No free air. No mesenteric fluid collections identified. No significant stool burden. No evidence of hernia. Bladder:  The bladder is normal. Genital:   Unremarkable       Bony Structures:   Visualized bony structures are consistent with the patient's age.     Impression: Impression: 1.Pneumomediastinum, most pronounced along the distal esophagus. No evidence of free air or air within the upper abdomen. Findings suspicious for esophageal tear given history of vomiting. No acute intra-abdominal or intrapelvic process. 2.Ancillary findings as described above. Electronically Signed: Aleja Kingston MD  2/11/2025 2:53 AM EST  Workstation ID: [id number]         Current medications:  Scheduled Meds:cefTRIAXone, 1,000 mg,  Intravenous, Q24H  sodium chloride, 10 mL, Intravenous, Q12H      Continuous Infusions:   PRN Meds:.  albuterol    senna-docusate sodium **AND** polyethylene glycol **AND** bisacodyl **AND** bisacodyl    Calcium Replacement - Follow Nurse / BPA Driven Protocol    Magnesium Standard Dose Replacement - Follow Nurse / BPA Driven Protocol    Morphine **AND** naloxone    nitroglycerin    OLANZapine zydis    ondansetron    Phosphorus Replacement - Follow Nurse / BPA Driven Protocol    Potassium Replacement - Follow Nurse / BPA Driven Protocol    promethazine    [COMPLETED] Insert Peripheral IV **AND** sodium chloride    sodium chloride    sodium chloride    Assessment & Plan   Assessment & Plan     Active Hospital Problems    Diagnosis  POA    **Pneumomediastinum [J98.2]  Yes    Acute UTI (urinary tract infection) [N39.0]  Yes    Nausea and vomiting [R11.2]  Yes    Hypokalemia [E87.6]  Yes      Resolved Hospital Problems   No resolved problems to display.        Brief Hospital Course to date:  Gorge Canela is a             [facility]  ADMISSION FOLLOW-UP NOTE              Patient admitted after midnight, H&P by my partner performed earlier on today's date reviewed.  Interim findings, labs, and charting also reviewed.          [facility] Hospital Problem List has been managed and updated to include any new diagnoses:        Active Hospital Problems     Diagnosis   POA    **Pneumomediastinum [J98.2]   Yes    Acute UTI (urinary tract infection) [N39.0]   Yes    Nausea and vomiting [R11.2]   Yes    Hypokalemia [E87.6]   Yes       Resolved Hospital Problems   No resolved problems to display.            ADDITIONAL PLAN:  - detailed assessment and plan from admission reviewed  -   Patient seen and examined at bedside. H&P by my partner, Dr Ch, on this same date reviewed. Agree with current plan of care with below updates     20 y/o otherwise fairly healthy female here now  w/    Pneumomediastinum  -This is likely due to forceful vomiting as pt describes hematemesis. Patient describes viral syndrome with severe cough resulting in post tussive emesis as cause.   -Dr. Sandy with [facility] as well as [facility] notified and both feel pt will not likely need surgical intervention. Gastrografin swallow performed and neg for leak.   -Improved after olanzapine ODT - continue zofran/phenergan prn.       N/V  -as above  -likely secondary to her UTI     UTI, POA  -tx w/ rocephin   -ucx     Hypokalemia  -replace and check mag       Home later today if tolerates food without difficulty.    Expected Discharge Location and Transportation:   Expected Discharge   Expected Discharge Date: 2/12/2025; Expected Discharge Time:      VTE Prophylaxis:  Mechanical VTE prophylaxis orders are present.         AM-PAC 6 Clicks Score (PT): 24 (02/11/25 2201)    CODE STATUS:   There are no questions and answers to display.       Carolyn Bonner II, DO  02/12/25

## 2025-02-13 ENCOUNTER — APPOINTMENT (OUTPATIENT)
Dept: GENERAL RADIOLOGY | Facility: HOSPITAL | Age: 20
End: 2025-02-13
Payer: COMMERCIAL

## 2025-02-13 ENCOUNTER — READMISSION MANAGEMENT (OUTPATIENT)
Dept: CALL CENTER | Facility: HOSPITAL | Age: 20
End: 2025-02-13
Payer: COMMERCIAL

## 2025-02-13 VITALS
DIASTOLIC BLOOD PRESSURE: 62 MMHG | WEIGHT: 240 LBS | HEART RATE: 69 BPM | SYSTOLIC BLOOD PRESSURE: 104 MMHG | RESPIRATION RATE: 18 BRPM | TEMPERATURE: 97.6 F | OXYGEN SATURATION: 96 % | BODY MASS INDEX: 40.97 KG/M2 | HEIGHT: 64 IN

## 2025-02-13 PROBLEM — J98.2 PNEUMOMEDIASTINUM: Status: RESOLVED | Noted: 2025-02-11 | Resolved: 2025-02-13

## 2025-02-13 LAB — POTASSIUM SERPL-SCNC: 3.4 MMOL/L (ref 3.5–5.2)

## 2025-02-13 PROCEDURE — G0378 HOSPITAL OBSERVATION PER HR: HCPCS

## 2025-02-13 PROCEDURE — 71045 X-RAY EXAM CHEST 1 VIEW: CPT

## 2025-02-13 PROCEDURE — 96376 TX/PRO/DX INJ SAME DRUG ADON: CPT

## 2025-02-13 PROCEDURE — 84132 ASSAY OF SERUM POTASSIUM: CPT | Performed by: INTERNAL MEDICINE

## 2025-02-13 PROCEDURE — 25010000002 MORPHINE PER 10 MG: Performed by: INTERNAL MEDICINE

## 2025-02-13 PROCEDURE — 25010000002 CEFTRIAXONE PER 250 MG: Performed by: INTERNAL MEDICINE

## 2025-02-13 PROCEDURE — 99231 SBSQ HOSP IP/OBS SF/LOW 25: CPT

## 2025-02-13 PROCEDURE — 99239 HOSP IP/OBS DSCHRG MGMT >30: CPT | Performed by: INTERNAL MEDICINE

## 2025-02-13 RX ORDER — POTASSIUM CHLORIDE 1500 MG/1
40 TABLET, EXTENDED RELEASE ORAL EVERY 4 HOURS
Status: COMPLETED | OUTPATIENT
Start: 2025-02-13 | End: 2025-02-13

## 2025-02-13 RX ORDER — PANTOPRAZOLE SODIUM 40 MG/1
40 TABLET, DELAYED RELEASE ORAL
Status: DISCONTINUED | OUTPATIENT
Start: 2025-02-13 | End: 2025-02-13 | Stop reason: HOSPADM

## 2025-02-13 RX ORDER — HYDROCODONE BITARTRATE AND ACETAMINOPHEN 5; 325 MG/1; MG/1
1 TABLET ORAL EVERY 6 HOURS PRN
Qty: 12 TABLET | Refills: 0 | Status: SHIPPED | OUTPATIENT
Start: 2025-02-13 | End: 2025-02-16

## 2025-02-13 RX ORDER — HYDROCODONE BITARTRATE AND ACETAMINOPHEN 5; 325 MG/1; MG/1
1 TABLET ORAL EVERY 6 HOURS PRN
Status: DISCONTINUED | OUTPATIENT
Start: 2025-02-13 | End: 2025-02-13 | Stop reason: HOSPADM

## 2025-02-13 RX ORDER — PANTOPRAZOLE SODIUM 40 MG/1
40 TABLET, DELAYED RELEASE ORAL
Qty: 30 TABLET | Refills: 2 | Status: SHIPPED | OUTPATIENT
Start: 2025-02-13

## 2025-02-13 RX ORDER — OLANZAPINE 5 MG/1
5 TABLET, ORALLY DISINTEGRATING ORAL 2 TIMES DAILY PRN
Qty: 10 TABLET | Refills: 0 | Status: SHIPPED | OUTPATIENT
Start: 2025-02-13 | End: 2025-02-18

## 2025-02-13 RX ORDER — OLANZAPINE 5 MG/1
5 TABLET, ORALLY DISINTEGRATING ORAL 2 TIMES DAILY PRN
Qty: 10 TABLET | Refills: 0 | Status: SHIPPED | OUTPATIENT
Start: 2025-02-13 | End: 2025-02-13

## 2025-02-13 RX ADMIN — HYDROCODONE BITARTRATE AND ACETAMINOPHEN 1 TABLET: 5; 325 TABLET ORAL at 08:27

## 2025-02-13 RX ADMIN — POTASSIUM CHLORIDE 40 MEQ: 1500 TABLET, EXTENDED RELEASE ORAL at 09:30

## 2025-02-13 RX ADMIN — SODIUM CHLORIDE 1000 MG: 900 INJECTION INTRAVENOUS at 08:27

## 2025-02-13 RX ADMIN — MORPHINE SULFATE 1 MG: 2 INJECTION, SOLUTION INTRAMUSCULAR; INTRAVENOUS at 05:11

## 2025-02-13 RX ADMIN — PANTOPRAZOLE SODIUM 40 MG: 40 TABLET, DELAYED RELEASE ORAL at 08:27

## 2025-02-13 RX ADMIN — Medication 10 ML: at 08:28

## 2025-02-13 RX ADMIN — POTASSIUM CHLORIDE 40 MEQ: 1500 TABLET, EXTENDED RELEASE ORAL at 05:57

## 2025-02-13 NOTE — OUTREACH NOTE
Prep Survey      Flowsheet Row Responses   [facility] patient discharged from? [place]   Is LACE score < 7 ? No   Eligibility [facility]   Date of Admission 02/11/25   Date of Discharge 02/13/25   Discharge Disposition Home or Self Care   Discharge diagnosis Pneumomediastinum-Acute UTI   Does the patient have one of the following disease processes/diagnoses(primary or secondary)? Other   Does the patient have Home health ordered? No   Is there a DME ordered? No   Prep survey completed? Yes            SAJI HAGAN - Registered Nurse

## 2025-02-13 NOTE — DISCHARGE SUMMARY
[facility] Medicine Services  DISCHARGE SUMMARY    Patient Name: Gorge Canela  : 2005  MRN: [id number]    Date of Admission: 2025  3:31 AM  Date of Discharge:  2025  Primary Care Physician: Danny Rutledge DO    Consults       Date and Time Order Name Status Description    2025  5:19 AM Inpatient Cardiothoracic Surgery Consult Completed             Hospital Course     Presenting Problem: n/v    Active Hospital Problems    Diagnosis  POA    Hypokalemia [E87.6]  Yes      Resolved Hospital Problems    Diagnosis Date Resolved POA    **Pneumomediastinum [J98.2] 2025 Yes    Acute UTI (urinary tract infection) [N39.0] 2025 Yes    Nausea and vomiting [R11.2] 2025 Yes          Hospital Course:  18 y/o otherwise fairly healthy female here now w/     Pneumomediastinum  -This is likely due to forceful vomiting as pt describes hematemesis. Patient describes viral syndrome with severe cough resulting in post tussive emesis as cause. She improved after olanzapine ODT - continue zyprexa + phenergan prn at d/c.  -Dr. Sandy with [facility] as well as [facility] notified and both feel pt will not likely need surgical intervention. Gastrografin swallow performed and neg for leak. Okay for home from [facility] standpoint.  -PPI at d/c.       Discharge Follow Up Recommendations for outpatient labs/diagnostics:   PCP 1 week       Day of Discharge     HPI: Up in bed with mother in room. Patient doing well. Only complaining of some mild burning when swallowing. Tolerating diet.    Review of Systems  Gen- No fevers, chills  CV- No chest pain, palpitations  Resp- No cough, dyspnea  GI- No N/V/D, abd pain    Vital Signs:   Temp:  [97.5 °F (36.4 °C)-97.9 °F (36.6 °C)] 97.6 °F (36.4 °C)  Heart Rate:  [63-95] 69  Resp:  [18] 18  BP: (104-121)/(62-77) 104/62      Physical Exam:  Constitutional: No acute distress, awake, alert  HENT: NCAT, mucous membranes moist  Respiratory: Clear to  auscultation bilaterally, respiratory effort normal   Cardiovascular: RRR, no murmurs, rubs, or gallops  Gastrointestinal: Positive bowel sounds, soft, nontender, nondistended  Musculoskeletal: No bilateral ankle edema  Psychiatric: Appropriate affect, cooperative  Neurologic: Oriented x 3, strength symmetric in all extremities, Cranial Nerves grossly intact to confrontation, speech clear  Skin: No rashes     Pertinent  and/or Most Recent Results     LAB RESULTS:      Lab 02/11/25  0639 02/11/25  0349 02/10/25  2334   WBC 6.90  --  16.07*   HEMOGLOBIN 12.7  --  15.6   HEMATOCRIT 37.0  --  45.6   PLATELETS 243  --  330   NEUTROS ABS 5.14  --  12.68*   IMMATURE GRANS (ABS) 0.01  --  0.10*   LYMPHS ABS 1.08  --  2.07   MONOS ABS 0.67  --  1.17*   EOS ABS 0.00  --  0.01   MCV 84.1  --  83.2   LACTATE  --  1.8  --          Lab 02/13/25  0454 02/11/25  0729 02/10/25  2334   SODIUM  --  141 139   POTASSIUM 3.4* 3.3* 3.4*   CHLORIDE  --  103 96*   CO2  --  24.0 24.0   ANION GAP  --  14.0 19.0*   BUN  --  9 13   CREATININE  --  0.56* 1.06*   EGFR  --  134.2 77.3   GLUCOSE  --  111* 129*   CALCIUM  --  8.6 9.7   MAGNESIUM  --  2.2  --    PHOSPHORUS  --  4.0  --          Lab 02/10/25  2334   TOTAL PROTEIN 8.1   ALBUMIN 4.5   GLOBULIN 3.6   ALT (SGPT) 24   AST (SGOT) 28   BILIRUBIN 0.7   ALK PHOS 115   LIPASE 21                     Brief Urine Lab Results  (Last result in the past 365 days)        Color   Clarity   Blood   Leuk Est   Nitrite   Protein   CREAT   Urine HCG        02/10/25 2346               Negative             Microbiology Results (last 10 days)       Procedure Component Value - Date/Time    Blood Culture - Blood, Arm, Left [[id number]]  (Normal) Collected: 02/11/25 0350    Lab Status: Preliminary result Specimen: Blood from Arm, Left Updated: 02/13/25 0400     Blood Culture No growth at 2 days    Blood Culture - Blood, Arm, Right [[id number]]  (Normal) Collected: 02/11/25 8675    Lab Status: Preliminary result  Specimen: Blood from Arm, Right Updated: 02/13/25 0400     Blood Culture No growth at 2 days    Urine Culture - Urine, Urine, Clean Catch [[id number]] Collected: 02/10/25 2338    Lab Status: Final result Specimen: Urine, Clean Catch Updated: 02/12/25 0952     Urine Culture 50,000 CFU/mL Normal Urogenital Kelli    Narrative:      Colonization of the urinary tract without infection is common. Treatment is discouraged unless the patient is symptomatic, pregnant, or undergoing an invasive urologic procedure.            XR Chest 1 View    Result Date: 2/13/2025  XR CHEST 1 VW Date of Exam: 2/13/2025 12:26 AM EST Indication: Pneumomediastinum Comparison: 2/12/2025 Findings: Mild cardiomegaly is stable. Pulmonary vessels are normal. Lung volumes are low. Again seen are changes of pneumomediastinum, which is not significantly changed. There has been some improvement in subcutaneous emphysema overlying the supraclavicular regions and base of the neck. There is no evidence pneumothorax. No focal airspace consolidation. No pleural effusion. Bony structures are unremarkable.     Impression: 1. Pneumomediastinum similar to prior exam with some improvement in subcutaneous emphysema overlying the base of neck and supraclavicular regions. 2. No evidence of pneumothorax. Electronically Signed: Ahmet Vargas MD  2/13/2025 7:09 AM EST  Workstation ID: [id number]    XR Chest 1 View    Result Date: 2/12/2025  XR CHEST 1 VW Date of Exam: 2/12/2025 12:19 AM EST Indication: Pneumomediastinum Comparison: CT chest 2/11/2025, chest x-ray 4/7/2024 Findings: There is pneumomediastinum with gas tracking into the base of the neck. No significant pneumothorax. Heart size normal. Lungs are clear.     Impression: Pneumomediastinum is unchanged from CT chest 2/11/2025. Electronically Signed: Malena Brown MD  2/12/2025 6:42 AM EST  Workstation ID: [id number]    FL ESOPHAGRAM SINGLE CONTRAST    Result Date: 2/11/2025  FL ESOPHAGRAM SINGLE CONTRAST Date of  Exam: 2/11/2025 11:00 AM EST Indication: Questionable esophageal perforation.   Comparison: None available. Technique:   imaging was obtained. The patient ingested water soluble contrast for fluoroscopic guided imaging of the esophagus. Fluoroscopic Time: 1 minute Number of Images: 1  image and 5 fluoroscopic loops Findings: Slightly limited examination due to patient condition and inability to ingest large volumes of fluid. Pre-procedure radiograph: Bowel gas is present in a nonobstructive pattern. There is no visible pneumoperitoneum, abnormal abdominal calcification, or abnormal mass. Swallow: Normal. No aspiration. Esophagus: No evidence of esophageal leak. Stomach: Contrast passes easily into a normal-appearing stomach. Other: Extensive pneumomediastinum and gas seen within the soft tissues of the neck.     Impression: No evidence of esophageal leak. Exam was slightly limited due to patient condition and inability to ingest large volumes of fluid. Exam was only done with water-soluble contrast. Electronically Signed: Nick Sarah MD  2/11/2025 12:15 PM EST  Workstation ID: [id number]    CT Chest With Contrast Diagnostic    Result Date: 2/11/2025  CT CHEST W CONTRAST DIAGNOSTIC Date of Exam: 2/11/2025 3:53 AM EST Indication: pneumediastinum. Comparison: For 724. Technique: Axial CT images were obtained of the chest after the uneventful intravenous administration of intravenous contrast.  Reconstructed coronal and sagittal images were also obtained. Automated exposure control and iterative construction methods were used. Findings: Hilum and Mediastinum: No pathologically enlarged lymph nodes.  Normal heart size.   No pericardial effusion.  Unremarkable thoracic aorta and pulmonary arteries. There is diffuse pneumomediastinum present with air seen dissecting along the base of the face and throughout the neck bilaterally. The esophagus appears grossly unremarkable. No abnormal collection identified.  Bronchi appear grossly unremarkable. Lung Parenchyma and Pleura: No focal consolidations.  No suspicious pulmonary nodules.  No endobronchial lesions.  No significant pleural effusions. Upper Abdomen: No acute process. Soft tissues: Unremarkable. Osseous structures: No aggressive focal lytic or sclerotic osseous lesions.     Impression: 1.Diffuse pneumomediastinum with air seen dissecting along the base of the face and throughout the neck bilaterally. The esophagus appears grossly unremarkable. No abnormal collection identified. Otherwise, no acute cardiopulmonary process. 2.Ancillary findings as described above. Electronically Signed: Aleja Kingston MD  2/11/2025 4:05 AM EST  Workstation ID: [id number]    CT Abdomen Pelvis With Contrast    Result Date: 2/11/2025  CT ABDOMEN PELVIS W CONTRAST Date of Exam: 2/11/2025 2:42 AM EST Indication: Upper abdominal pain, nausea and vomiting. Comparison: 4/25/2024, 11/20/2023. Technique: Axial CT images were obtained of the abdomen and pelvis following the uneventful intravenous administration intravenous contrast reconstructed coronal and sagittal images were also obtained. Automated exposure control and iterative construction methods were used. Findings: Lung Bases:   The visualized lung bases and lower mediastinal structures demonstrate air along the mediastinum, most pronounced along the distal esophagus. A small amount of air seen extending within the anterior mediastinum. No evidence of free air or air within the upper abdomen. Liver: The liver appears hypodense consistent with steatosis.. No focal lesions. Biliary/Gallbladder:  The gallbladder is normal without evidence of radiopaque stones. The biliary tree is nondilated. Spleen: Spleen is normal in size and CT density. Pancreas:  Pancreas is normal. There is no evidence of pancreatic mass or peripancreatic fluid. Kidneys:  Kidneys are normal in size. There are no stones or hydronephrosis. Adrenals:  Adrenal glands are  unremarkable. Retroperitoneal/Lymph Nodes/Vasculature:  No retroperitoneal adenopathy is identified. Gastrointestinal/Mesentery:  The bowel loops are non-dilated without wall thickening or mass. The appendix appears within normal limits. No evidence of obstruction. No free air. No mesenteric fluid collections identified. No significant stool burden. No evidence of hernia. Bladder:  The bladder is normal. Genital:   Unremarkable       Bony Structures:   Visualized bony structures are consistent with the patient's age.     Impression: 1.Pneumomediastinum, most pronounced along the distal esophagus. No evidence of free air or air within the upper abdomen. Findings suspicious for esophageal tear given history of vomiting. No acute intra-abdominal or intrapelvic process. 2.Ancillary findings as described above. Electronically Signed: Aleja Kingston MD  2/11/2025 2:53 AM EST  Workstation ID: [id number]                 Plan for Follow-up of Pending Labs/Results:   Pending Labs       Order Current Status    Blood Culture - Blood, Arm, Left Preliminary result    Blood Culture - Blood, Arm, Right Preliminary result          Discharge Details        Discharge Medications        New Medications        Instructions Start Date   HYDROcodone-acetaminophen 5-325 MG per tablet  Commonly known as: NORCO   1 tablet, Oral, Every 6 Hours PRN      OLANZapine zydis 5 MG disintegrating tablet  Commonly known as: zyPREXA   5 mg, Translingual, 2 Times Daily PRN      pantoprazole 40 MG EC tablet  Commonly known as: PROTONIX   40 mg, Oral, Every Early Morning      promethazine 25 MG tablet  Commonly known as: PHENERGAN   25 mg, Oral, Every 6 Hours PRN             Continue These Medications        Instructions Start Date   albuterol sulfate  (90 Base) MCG/ACT inhaler  Commonly known as: PROVENTIL HFA;VENTOLIN HFA;PROAIR HFA   2 puffs, Inhalation, Every 6 Hours PRN      cimetidine 300 MG tablet  Commonly known as: TAGAMET   TAKE 1 TABLET BY  MOUTH 4 TIMES A DAY AS NEEDED FOR ITCHING      colestipol 1 g tablet  Commonly known as: COLESTID   1 g, Oral, 2 Times Daily      Vortioxetine HBr 10 MG tablet tablet  Commonly known as: Trintellix   10 mg, Oral, Daily With Breakfast             Stop These Medications      ondansetron ODT 4 MG disintegrating tablet  Commonly known as: ZOFRAN-ODT              Allergies   Allergen Reactions    Delsym [Dextromethorphan] Rash    Lexapro [Escitalopram] Rash         Discharge Disposition:  Home or Self Care    Diet:  Hospital:  Diet Order   Procedures    Diet: Regular/House; Fluid Consistency: Thin (IDDSI 0)            Activity:      Restrictions or Other Recommendations:         CODE STATUS:    There are no questions and answers to display.       No future appointments.              Carolyn Bonner II, DO  02/13/25      Time Spent on Discharge:  I spent  33  minutes on this discharge activity which included: face-to-face encounter with the patient, reviewing the data in the system, coordination of the care with the nursing staff as well as consultants, documentation, and entering orders.

## 2025-02-13 NOTE — CASE MANAGEMENT/SOCIAL WORK
Case Management Discharge Note      Final Note: Pt is being discharged home today. She is on room air. A work excuse was provided with a RTW date of Monday, 2/17, per Dr Bonner. At Pt's request, she was given the phone number to the billing department ([phone]) to discuss financial assistance. Family will provide transportation via private vehicle. No other discharge needs identified.         Selected Continued Care - Admitted Since 2/11/2025       Destination    No services have been selected for the patient.                Durable Medical Equipment    No services have been selected for the patient.                Dialysis/Infusion    No services have been selected for the patient.                Home Medical Care    No services have been selected for the patient.                Therapy    No services have been selected for the patient.                Community Resources    No services have been selected for the patient.                Community & DME    No services have been selected for the patient.                    Transportation Services  Private: Car    Final Discharge Disposition Code: 01 - home or self-care

## 2025-02-13 NOTE — PROGRESS NOTES
[facility] Cardiothoracic Surgery In-Patient Progress Note     LOS: 0 days     Chief Complaint: Pneumomediastinum    Subjective  No acute events. VSS on room air.       Objective  Vital Signs  Temp:  [97.5 °F (36.4 °C)-97.9 °F (36.6 °C)] 97.6 °F (36.4 °C)  Heart Rate:  [63-95] 69  Resp:  [18] 18  BP: (104-121)/(62-77) 104/62        Physical Exam:   General Appearance: alert, appears stated age and cooperative   Lungs: clear to auscultation, respirations regular, respirations even, and respirations unlabored   Heart: regular rhythm & normal rate, normal S1, S2, no murmur, no gallop, no rub, and no click     Results     Results from last 7 days   Lab Units 02/11/25  0639   WBC 10*3/mm3 6.90   HEMOGLOBIN g/dL 12.7   HEMATOCRIT % 37.0   PLATELETS 10*3/mm3 243     Results from last 7 days   Lab Units 02/13/25  0454 02/11/25  0729   SODIUM mmol/L  --  141   POTASSIUM mmol/L 3.4* 3.3*   CHLORIDE mmol/L  --  103   CO2 mmol/L  --  24.0   BUN mg/dL  --  9   CREATININE mg/dL  --  0.56*   GLUCOSE mg/dL  --  111*   CALCIUM mg/dL  --  8.6     Imaging Results (Last 24 Hours)       Procedure Component Value Units Date/Time    XR Chest 1 View [[id number]] Collected: 02/13/25 0708     Updated: 02/13/25 0712    Narrative:      XR CHEST 1 VW    Date of Exam: 2/13/2025 12:26 AM EST    Indication: Pneumomediastinum    Comparison: 2/12/2025    Findings:  Mild cardiomegaly is stable. Pulmonary vessels are normal. Lung volumes are low. Again seen are changes of pneumomediastinum, which is not significantly changed. There has been some improvement in subcutaneous emphysema overlying the supraclavicular   regions and base of the neck. There is no evidence pneumothorax. No focal airspace consolidation. No pleural effusion. Bony structures are unremarkable.      Impression:      Impression:    1. Pneumomediastinum similar to prior exam with some improvement in subcutaneous emphysema overlying the base of neck and supraclavicular regions.  2.  No evidence of pneumothorax.      Electronically Signed: Ahmet Vargas MD    2/13/2025 7:09 AM EST    Workstation ID: [id number]            Assessment    Pneumomediastinum    Hypokalemia      Plan   Has remained afebrile and no white count. Some improvement seen on her chest x-ray this morning. Ok to discharge from our standpoint.      Jodi Snow, APRN  02/13/25  07:40 EST

## 2025-02-13 NOTE — PLAN OF CARE
Goal Outcome Evaluation:              Outcome Evaluation: Pt on RA, NSR/SB on tele, VSS. PRN IV morphine given twice for complaints of epigastric pain with improvement. No complaints N/V this shift. No acute changes at this time. Will continue with POC.

## 2025-02-14 ENCOUNTER — TRANSITIONAL CARE MANAGEMENT TELEPHONE ENCOUNTER (OUTPATIENT)
Dept: CALL CENTER | Facility: HOSPITAL | Age: 20
End: 2025-02-14
Payer: COMMERCIAL

## 2025-02-14 NOTE — OUTREACH NOTE
Call Center TCM Note      Flowsheet Row Responses   [facility], [place], operated by [facility] patient discharged from? [place]   Does the patient have one of the following disease processes/diagnoses(primary or secondary)? Other   TCM attempt successful? Yes   Call start time 1208   Call end time 1211   Discharge diagnosis Pneumomediastinum   Person spoke with today (if not patient) and relationship Patient   Meds reviewed with patient/caregiver? Yes   Does the patient have all medications ordered at discharge? Yes   Is the patient taking all medications as directed (includes completed medication regime)? Yes   Comments PCP DR Rutledge. Hospital follow up appt in place for 2/19  945am with PCP.   Does the patient have an appointment with their PCP within 7-14 days of discharge? Yes   Has home health visited the patient within 72 hours of discharge? N/A   Psychosocial issues? No   Did the patient receive a copy of their discharge instructions? Yes   Nursing interventions Reviewed instructions with patient   What is the patient's perception of their health status since discharge? Improving   Is the patient/caregiver able to teach back signs and symptoms related to disease process for when to call PCP? Yes   Is the patient/caregiver able to teach back signs and symptoms related to disease process for when to call 911? Yes   Is the patient/caregiver able to teach back the hierarchy of who to call/visit for symptoms/problems? PCP, Specialist, Home health nurse, Urgent Care, ED, 911 Yes   If the patient is a current smoker, are they able to teach back resources for cessation? Not a smoker   TCM call completed? Yes   Wrap up additional comments 3/10/2025  1:00 PM Arrive by 12:30 PM FOLLOW UP [facility] CARDIOTHORACIC SURGERY Ariel De Paz APRN   Call end time 1211   Would this patient benefit from a Referral to Amb Social Work? No   Is the patient interested in additional calls from an ambulatory ? No             Janel Ceron, RN    2/14/2025, 12:14 EST

## 2025-02-16 LAB
BACTERIA SPEC AEROBE CULT: NORMAL
BACTERIA SPEC AEROBE CULT: NORMAL

## 2025-02-24 ENCOUNTER — READMISSION MANAGEMENT (OUTPATIENT)
Dept: CALL CENTER | Facility: HOSPITAL | Age: 20
End: 2025-02-24
Payer: COMMERCIAL

## 2025-02-24 NOTE — OUTREACH NOTE
Medical Week 2 Survey      Flowsheet Row Responses   [facility] patient discharged from? [place]   Does the patient have one of the following disease processes/diagnoses(primary or secondary)? Other   Week 2 attempt successful? Yes   Call start time 1432   Discharge diagnosis Pneumomediastinum   Call end time 1437   Is the patient having any side effects they believe may be caused by any medication additions or changes? No   Is the patient taking all medications as directed (includes completed medication regime)? Yes   Does the patient have an appointment with their PCP within 7 days of discharge? Greater than 7 days   What is preventing the patient from scheduling follow up appointments within 7 days of discharge? Earlier appointment not available   Nursing Interventions Advised patient to make appointment   Comments Patient states that she is doing well at home, she has returned to work. The patient has returned to work, she reports. Patient is tolerating po intake w/o emesis with medication controlling her nausea, per pt report. Patient denies dysuria, reports being well hydrated, urine is pale yellow.   What is the patient's perception of their health status since discharge? Returned to baseline/stable   Is the patient/caregiver able to teach back signs and symptoms related to disease process for when to call PCP? Yes   Week 2 Call Completed? Yes   Revoked No further contact(revokes)-requires comment   Call end time 1437            Yesi VACA - Registered Nurse

## 2025-03-11 ENCOUNTER — TELEPHONE (OUTPATIENT)
Dept: CARDIAC SURGERY | Facility: CLINIC | Age: 20
End: 2025-03-11
Payer: COMMERCIAL

## 2025-03-11 NOTE — TELEPHONE ENCOUNTER
TRIED SPEAKING TO PATIENT ABOUT RESCHEDULING HER 3/10 APPOINTMENT, WHILE SPEAKING TO HER SHE HUNG UP. MAILING OUT 1ST LETTER

## 2025-04-18 ENCOUNTER — TELEPHONE (OUTPATIENT)
Dept: CARDIAC SURGERY | Facility: CLINIC | Age: 20
End: 2025-04-18
Payer: COMMERCIAL

## 2025-04-18 NOTE — TELEPHONE ENCOUNTER
SPOKE TO PATIENT'S MOTHER ABOUT RESCHEDULING HER 3/10 APPOINTMENT, SHE INFORMED ME THAT SHE'LL GIVE OUR OFFICE A CALL BACK ONCE SHE TALKS TO THE PATIENT. MAILING OUT 2ND LETTER

## 2025-05-20 ENCOUNTER — TELEPHONE (OUTPATIENT)
Dept: CARDIAC SURGERY | Facility: CLINIC | Age: 20
End: 2025-05-20
Payer: COMMERCIAL

## 2025-05-20 NOTE — TELEPHONE ENCOUNTER
SPOKE TO PATIENT'S MOTHER ABOUT R/S APPT. SHE SAID SHE DOUBTS THE PATIENT WILL WANT TO R/S SINCE SHE IS WORKING OUT A BILL WITH [employer] RIGHT NOW BUT SHE WILL HAVE HER CALL IF SHE WANTS TO R/S.

## 2025-06-23 ENCOUNTER — TELEPHONE (OUTPATIENT)
Dept: CARDIAC SURGERY | Facility: CLINIC | Age: 20
End: 2025-06-23
Payer: COMMERCIAL

## 2025-07-23 ENCOUNTER — APPOINTMENT (OUTPATIENT)
Facility: HOSPITAL | Age: 20
End: 2025-07-23
Payer: COMMERCIAL

## 2025-07-23 ENCOUNTER — HOSPITAL ENCOUNTER (EMERGENCY)
Facility: HOSPITAL | Age: 20
Discharge: HOME | End: 2025-07-23
Attending: STUDENT IN AN ORGANIZED HEALTH CARE EDUCATION/TRAINING PROGRAM | Admitting: STUDENT IN AN ORGANIZED HEALTH CARE EDUCATION/TRAINING PROGRAM
Payer: COMMERCIAL

## 2025-07-23 VITALS
DIASTOLIC BLOOD PRESSURE: 77 MMHG | HEIGHT: 64 IN | HEART RATE: 68 BPM | SYSTOLIC BLOOD PRESSURE: 116 MMHG | BODY MASS INDEX: 34.31 KG/M2 | RESPIRATION RATE: 16 BRPM | OXYGEN SATURATION: 96 % | TEMPERATURE: 98.7 F | WEIGHT: 201 LBS

## 2025-07-23 DIAGNOSIS — R11.2 NAUSEA AND VOMITING, UNSPECIFIED VOMITING TYPE: Primary | ICD-10-CM

## 2025-07-23 LAB
ALBUMIN SERPL-MCNC: 4.6 G/DL (ref 3.5–5.2)
ALBUMIN/GLOB SERPL: 1.5 G/DL
ALP SERPL-CCNC: 129 U/L (ref 39–117)
ALT SERPL W P-5'-P-CCNC: 11 U/L (ref 1–33)
AMPHET+METHAMPHET UR QL: NEGATIVE
AMPHETAMINES UR QL: NEGATIVE
ANION GAP SERPL CALCULATED.3IONS-SCNC: 14.9 MMOL/L (ref 5–15)
AST SERPL-CCNC: 15 U/L (ref 1–32)
BACTERIA UR QL AUTO: ABNORMAL /HPF
BARBITURATES UR QL SCN: NEGATIVE
BASOPHILS # BLD AUTO: 0.04 10*3/MM3 (ref 0–0.2)
BASOPHILS NFR BLD AUTO: 0.2 % (ref 0–1.5)
BENZODIAZ UR QL SCN: NEGATIVE
BILIRUB SERPL-MCNC: 0.4 MG/DL (ref 0–1.2)
BILIRUB UR QL STRIP: NEGATIVE
BUN SERPL-MCNC: 9.3 MG/DL (ref 6–20)
BUN/CREAT SERPL: 17.2 (ref 7–25)
BUPRENORPHINE SERPL-MCNC: NEGATIVE NG/ML
CALCIUM SPEC-SCNC: 9.7 MG/DL (ref 8.6–10.5)
CANNABINOIDS SERPL QL: POSITIVE
CHLORIDE SERPL-SCNC: 106 MMOL/L (ref 98–107)
CLARITY UR: CLEAR
CO2 SERPL-SCNC: 22.1 MMOL/L (ref 22–29)
COCAINE UR QL: NEGATIVE
COLOR UR: YELLOW
CREAT SERPL-MCNC: 0.54 MG/DL (ref 0.57–1)
DEPRECATED RDW RBC AUTO: 43.1 FL (ref 37–54)
EGFRCR SERPLBLD CKD-EPI 2021: 135.4 ML/MIN/1.73
EOSINOPHIL # BLD AUTO: 0 10*3/MM3 (ref 0–0.4)
EOSINOPHIL NFR BLD AUTO: 0 % (ref 0.3–6.2)
ERYTHROCYTE [DISTWIDTH] IN BLOOD BY AUTOMATED COUNT: 13.1 % (ref 12.3–15.4)
FENTANYL UR-MCNC: NEGATIVE NG/ML
GLOBULIN UR ELPH-MCNC: 3 GM/DL
GLUCOSE SERPL-MCNC: 104 MG/DL (ref 65–99)
GLUCOSE UR STRIP-MCNC: NEGATIVE MG/DL
HCG INTACT+B SERPL-ACNC: <0.2 MIU/ML
HCT VFR BLD AUTO: 43.6 % (ref 34–46.6)
HGB BLD-MCNC: 14.2 G/DL (ref 12–15.9)
HGB UR QL STRIP.AUTO: NEGATIVE
HOLD SPECIMEN: NORMAL
HYALINE CASTS UR QL AUTO: ABNORMAL /LPF
IMM GRANULOCYTES # BLD AUTO: 0.04 10*3/MM3 (ref 0–0.05)
IMM GRANULOCYTES NFR BLD AUTO: 0.2 % (ref 0–0.5)
KETONES UR QL STRIP: ABNORMAL
LEUKOCYTE ESTERASE UR QL STRIP.AUTO: NEGATIVE
LIPASE SERPL-CCNC: 18 U/L (ref 13–60)
LYMPHOCYTES # BLD AUTO: 1.13 10*3/MM3 (ref 0.7–3.1)
LYMPHOCYTES NFR BLD AUTO: 6.1 % (ref 19.6–45.3)
MCH RBC QN AUTO: 29 PG (ref 26.6–33)
MCHC RBC AUTO-ENTMCNC: 32.6 G/DL (ref 31.5–35.7)
MCV RBC AUTO: 89 FL (ref 79–97)
METHADONE UR QL SCN: NEGATIVE
MONOCYTES # BLD AUTO: 0.83 10*3/MM3 (ref 0.1–0.9)
MONOCYTES NFR BLD AUTO: 4.5 % (ref 5–12)
MUCOUS THREADS URNS QL MICRO: ABNORMAL /HPF
NEUTROPHILS NFR BLD AUTO: 16.48 10*3/MM3 (ref 1.7–7)
NEUTROPHILS NFR BLD AUTO: 89 % (ref 42.7–76)
NITRITE UR QL STRIP: NEGATIVE
OPIATES UR QL: NEGATIVE
OXYCODONE UR QL SCN: NEGATIVE
PCP UR QL SCN: NEGATIVE
PH UR STRIP.AUTO: 8 [PH] (ref 5–8)
PLATELET # BLD AUTO: 385 10*3/MM3 (ref 140–450)
PMV BLD AUTO: 11.9 FL (ref 6–12)
POTASSIUM SERPL-SCNC: 4 MMOL/L (ref 3.5–5.2)
PROT SERPL-MCNC: 7.6 G/DL (ref 6–8.5)
PROT UR QL STRIP: ABNORMAL
RBC # BLD AUTO: 4.9 10*6/MM3 (ref 3.77–5.28)
RBC # UR STRIP: ABNORMAL /HPF
REF LAB TEST METHOD: ABNORMAL
S PYO AG THROAT QL: NEGATIVE
SODIUM SERPL-SCNC: 143 MMOL/L (ref 136–145)
SP GR UR STRIP: 1.02 (ref 1–1.03)
SQUAMOUS #/AREA URNS HPF: ABNORMAL /HPF
TRICYCLICS UR QL SCN: NEGATIVE
UROBILINOGEN UR QL STRIP: ABNORMAL
WBC # UR STRIP: ABNORMAL /HPF
WBC NRBC COR # BLD AUTO: 18.52 10*3/MM3 (ref 3.4–10.8)
WHOLE BLOOD HOLD COAG: NORMAL
WHOLE BLOOD HOLD SPECIMEN: NORMAL

## 2025-07-23 PROCEDURE — 93005 ELECTROCARDIOGRAM TRACING: CPT | Performed by: STUDENT IN AN ORGANIZED HEALTH CARE EDUCATION/TRAINING PROGRAM

## 2025-07-23 PROCEDURE — 99285 EMERGENCY DEPT VISIT HI MDM: CPT | Performed by: STUDENT IN AN ORGANIZED HEALTH CARE EDUCATION/TRAINING PROGRAM

## 2025-07-23 PROCEDURE — 25010000002 DICYCLOMINE PER 20 MG: Performed by: STUDENT IN AN ORGANIZED HEALTH CARE EDUCATION/TRAINING PROGRAM

## 2025-07-23 PROCEDURE — 71045 X-RAY EXAM CHEST 1 VIEW: CPT

## 2025-07-23 PROCEDURE — 96374 THER/PROPH/DIAG INJ IV PUSH: CPT

## 2025-07-23 PROCEDURE — 81001 URINALYSIS AUTO W/SCOPE: CPT | Performed by: STUDENT IN AN ORGANIZED HEALTH CARE EDUCATION/TRAINING PROGRAM

## 2025-07-23 PROCEDURE — 80053 COMPREHEN METABOLIC PANEL: CPT | Performed by: STUDENT IN AN ORGANIZED HEALTH CARE EDUCATION/TRAINING PROGRAM

## 2025-07-23 PROCEDURE — 87880 STREP A ASSAY W/OPTIC: CPT | Performed by: STUDENT IN AN ORGANIZED HEALTH CARE EDUCATION/TRAINING PROGRAM

## 2025-07-23 PROCEDURE — 85025 COMPLETE CBC W/AUTO DIFF WBC: CPT | Performed by: STUDENT IN AN ORGANIZED HEALTH CARE EDUCATION/TRAINING PROGRAM

## 2025-07-23 PROCEDURE — 80307 DRUG TEST PRSMV CHEM ANLYZR: CPT | Performed by: STUDENT IN AN ORGANIZED HEALTH CARE EDUCATION/TRAINING PROGRAM

## 2025-07-23 PROCEDURE — 84702 CHORIONIC GONADOTROPIN TEST: CPT | Performed by: STUDENT IN AN ORGANIZED HEALTH CARE EDUCATION/TRAINING PROGRAM

## 2025-07-23 PROCEDURE — 25810000003 SODIUM CHLORIDE 0.9 % SOLUTION: Performed by: STUDENT IN AN ORGANIZED HEALTH CARE EDUCATION/TRAINING PROGRAM

## 2025-07-23 PROCEDURE — 74177 CT ABD & PELVIS W/CONTRAST: CPT

## 2025-07-23 PROCEDURE — 83690 ASSAY OF LIPASE: CPT | Performed by: STUDENT IN AN ORGANIZED HEALTH CARE EDUCATION/TRAINING PROGRAM

## 2025-07-23 PROCEDURE — 87081 CULTURE SCREEN ONLY: CPT | Performed by: STUDENT IN AN ORGANIZED HEALTH CARE EDUCATION/TRAINING PROGRAM

## 2025-07-23 PROCEDURE — 25510000001 IOPAMIDOL 61 % SOLUTION: Performed by: STUDENT IN AN ORGANIZED HEALTH CARE EDUCATION/TRAINING PROGRAM

## 2025-07-23 PROCEDURE — 25010000002 ONDANSETRON PER 1 MG: Performed by: STUDENT IN AN ORGANIZED HEALTH CARE EDUCATION/TRAINING PROGRAM

## 2025-07-23 PROCEDURE — 96372 THER/PROPH/DIAG INJ SC/IM: CPT

## 2025-07-23 RX ORDER — IOPAMIDOL 612 MG/ML
85 INJECTION, SOLUTION INTRAVASCULAR
Status: COMPLETED | OUTPATIENT
Start: 2025-07-23 | End: 2025-07-23

## 2025-07-23 RX ORDER — PROMETHAZINE HYDROCHLORIDE 25 MG/1
12.5 SUPPOSITORY RECTAL ONCE
Status: DISCONTINUED | OUTPATIENT
Start: 2025-07-23 | End: 2025-07-23 | Stop reason: HOSPADM

## 2025-07-23 RX ORDER — PROMETHAZINE HYDROCHLORIDE 25 MG/1
12.5 SUPPOSITORY RECTAL EVERY 6 HOURS PRN
Qty: 3 SUPPOSITORY | Refills: 0 | Status: SHIPPED | OUTPATIENT
Start: 2025-07-23 | End: 2025-07-25

## 2025-07-23 RX ORDER — SODIUM CHLORIDE 9 MG/ML
10 INJECTION, SOLUTION INTRAMUSCULAR; INTRAVENOUS; SUBCUTANEOUS AS NEEDED
Status: DISCONTINUED | OUTPATIENT
Start: 2025-07-23 | End: 2025-07-23 | Stop reason: HOSPADM

## 2025-07-23 RX ORDER — ONDANSETRON 2 MG/ML
4 INJECTION INTRAMUSCULAR; INTRAVENOUS ONCE
Status: COMPLETED | OUTPATIENT
Start: 2025-07-23 | End: 2025-07-23

## 2025-07-23 RX ORDER — DICYCLOMINE HYDROCHLORIDE 10 MG/ML
20 INJECTION INTRAMUSCULAR ONCE
Status: COMPLETED | OUTPATIENT
Start: 2025-07-23 | End: 2025-07-23

## 2025-07-23 RX ADMIN — DICYCLOMINE HYDROCHLORIDE 20 MG: 10 INJECTION, SOLUTION INTRAMUSCULAR at 13:22

## 2025-07-23 RX ADMIN — IOPAMIDOL 85 ML: 612 INJECTION, SOLUTION INTRAVENOUS at 14:25

## 2025-07-23 RX ADMIN — ONDANSETRON 4 MG: 2 INJECTION, SOLUTION INTRAMUSCULAR; INTRAVENOUS at 13:22

## 2025-07-23 RX ADMIN — SODIUM CHLORIDE 500 ML: 9 INJECTION, SOLUTION INTRAVENOUS at 13:21

## 2025-07-23 NOTE — FSED PROVIDER NOTE
"Subjective  History of Present Illness:    19 year old female otherwise healthy who presents with NVD since yesterday. She states she has had 4-5 episodes of emesis-initially non bloody but then she noticed some mild blood on later episodes. She reports associated \"diarrhea\" that she describes as formed not watery. No blood. She has had about 4-5 episodes of this as well. She reports generalized abd discomfort that she states is worse during emesis episodes. Denies fevers, recent abx use, travel hx, sick contacts. She took dose of zofran and states that has helped her NV      Nurses Notes reviewed and agree, including vitals, allergies, social history and prior medical history.     REVIEW OF SYSTEMS: All systems reviewed and not pertinent unless noted.  Review of Systems   All other systems reviewed and are negative.      Past Medical History:   Diagnosis Date    Allergic rhinitis due to cats     Anxiety     Depression     Itching        Allergies:    Delsym [dextromethorphan] and Lexapro [escitalopram]      Past Surgical History:   Procedure Laterality Date    CYST REMOVAL Left     left arm/wrist         Social History     Socioeconomic History    Marital status: Single   Tobacco Use    Smoking status: Never     Passive exposure: Yes    Smokeless tobacco: Never   Vaping Use    Vaping status: Never Used   Substance and Sexual Activity    Alcohol use: Yes    Drug use: Never    Sexual activity: Defer         No family history on file.    Objective  Physical Exam:  /77 (BP Location: Left arm, Patient Position: Sitting)   Pulse 72   Temp 98.7 °F (37.1 °C) (Oral)   Resp 16   Ht 162.6 cm (64\")   Wt 91.2 kg (201 lb)   LMP 07/16/2025 (Approximate)   SpO2 97%   BMI 34.50 kg/m²      Physical Exam  Constitutional:       Appearance: Normal appearance.   HENT:      Head: Normocephalic and atraumatic.      Nose: Nose normal.      Mouth/Throat:      Mouth: Mucous membranes are moist.   Eyes:      Extraocular " Movements: Extraocular movements intact.      Conjunctiva/sclera: Conjunctivae normal.   Cardiovascular:      Rate and Rhythm: Normal rate and regular rhythm.   Pulmonary:      Effort: Pulmonary effort is normal. No respiratory distress.   Abdominal:      General: There is no distension.      Palpations: Abdomen is soft.      Tenderness: There is no abdominal tenderness. There is no guarding or rebound.      Comments: Atraumatic    Musculoskeletal:         General: Normal range of motion.      Cervical back: Normal range of motion and neck supple.   Skin:     General: Skin is warm and dry.   Neurological:      General: No focal deficit present.      Mental Status: She is alert.      Comments: Moving all extremities spontaneously    Psychiatric:         Mood and Affect: Mood normal.         Behavior: Behavior normal.         Procedures    ED Course:         Lab Results (last 24 hours)       Procedure Component Value Units Date/Time    CBC & Differential [[id number]]  (Abnormal) Collected: 07/23/25 1308    Specimen: Blood Updated: 07/23/25 1324    Narrative:      The following orders were created for panel order CBC & Differential.  Procedure                               Abnormality         Status                     ---------                               -----------         ------                     CBC Auto Differential[[id number]]        Abnormal            Final result                 Please view results for these tests on the individual orders.    Comprehensive Metabolic Panel [[id number]]  (Abnormal) Collected: 07/23/25 1308    Specimen: Blood Updated: 07/23/25 1343     Glucose 104 mg/dL      BUN 9.3 mg/dL      Creatinine 0.54 mg/dL      Sodium 143 mmol/L      Potassium 4.0 mmol/L      Chloride 106 mmol/L      CO2 22.1 mmol/L      Calcium 9.7 mg/dL      Total Protein 7.6 g/dL      Albumin 4.6 g/dL      ALT (SGPT) 11 U/L      AST (SGOT) 15 U/L      Alkaline Phosphatase 129 U/L      Total Bilirubin 0.4 mg/dL       Globulin 3.0 gm/dL      A/G Ratio 1.5 g/dL      BUN/Creatinine Ratio 17.2     Anion Gap 14.9 mmol/L      eGFR 135.4 mL/min/1.73     Narrative:      GFR Categories in Chronic Kidney Disease (CKD)              GFR Category          GFR (mL/min/1.73)    Interpretation  G1                    90 or greater        Normal or high (1)  G2                    60-89                Mild decrease (1)  G3a                   45-59                Mild to moderate decrease  G3b                   30-44                Moderate to severe decrease  G4                    15-29                Severe decrease  G5                    14 or less           Kidney failure    (1)In the absence of evidence of kidney disease, neither GFR category G1 or G2 fulfill the criteria for CKD.    eGFR calculation 2021 CKD-EPI creatinine equation, which does not include race as a factor    Lipase [[id number]]  (Normal) Collected: 07/23/25 1308    Specimen: Blood Updated: 07/23/25 1343     Lipase 18 U/L     hCG, Quantitative, Pregnancy [[id number]] Collected: 07/23/25 1308    Specimen: Blood Updated: 07/23/25 1353     HCG Quantitative <0.20 mIU/mL     Narrative:      HCG Ranges by Gestational Age    Females - non-pregnant premenopausal   </= 1mIU/mL HCG  Females - postmenopausal               </= 7mIU/mL HCG    3 Weeks         5.8 -    71.2 mIU/mL  4 Weeks         9.5 -     750 mIU/mL  5 Weeks         217 -   7,138 mIU/mL  6 Weeks         158 -  31,795 mIU/mL  7 Weeks       3,697 - 163,563 mIU/mL  8 Weeks      32,065 - 149,571 mIU/mL  9 Weeks      63,803 - 151,410 mIU/mL  10 Weeks     46,509 - 186,977 mIU/mL  12 Weeks     27,832 - 210,612 mIU/mL  14 Weeks     13,950 -  62,530 mIU/mL  15 Weeks     12,039 -  70,971 mIU/mL  16 Weeks      9,040 -  56,451 mIU/mL  17 Weeks      8,175 -  55,868 mIU/mL  18 Weeks      8,099 -  58,176 mIU/mL    CBC Auto Differential [[id number]]  (Abnormal) Collected: 07/23/25 1308    Specimen: Blood Updated: 07/23/25 1324     WBC 18.52  10*3/mm3      RBC 4.90 10*6/mm3      Hemoglobin 14.2 g/dL      Hematocrit 43.6 %      MCV 89.0 fL      MCH 29.0 pg      MCHC 32.6 g/dL      RDW 13.1 %      RDW-SD 43.1 fl      MPV 11.9 fL      Platelets 385 10*3/mm3      Neutrophil % 89.0 %      Lymphocyte % 6.1 %      Monocyte % 4.5 %      Eosinophil % 0.0 %      Basophil % 0.2 %      Immature Grans % 0.2 %      Neutrophils, Absolute 16.48 10*3/mm3      Lymphocytes, Absolute 1.13 10*3/mm3      Monocytes, Absolute 0.83 10*3/mm3      Eosinophils, Absolute 0.00 10*3/mm3      Basophils, Absolute 0.04 10*3/mm3      Immature Grans, Absolute 0.04 10*3/mm3     Rapid Strep A Screen - Swab, Throat [[id number]]  (Normal) Collected: 07/23/25 1334    Specimen: Swab from Throat Updated: 07/23/25 1408     Strep A Ag Negative    Beta Strep Culture, Throat - Swab, Throat [[id number]] Collected: 07/23/25 1334    Specimen: Swab from Throat Updated: 07/23/25 1408    Urinalysis With Microscopic If Indicated (No Culture) - Urine, Clean Catch [[id number]]  (Abnormal) Collected: 07/23/25 1415    Specimen: Urine, Clean Catch Updated: 07/23/25 1431     Color, UA Yellow     Appearance, UA Clear     pH, UA 8.0     Specific Gravity, UA 1.020     Glucose, UA Negative     Ketones, UA 15 mg/dL (1+)     Bilirubin, UA Negative     Blood, UA Negative     Protein, UA 30 mg/dL (1+)     Leuk Esterase, UA Negative     Nitrite, UA Negative     Urobilinogen, UA 0.2 E.U./dL    Urinalysis, Microscopic Only - Urine, Clean Catch [[id number]]  (Abnormal) Collected: 07/23/25 1415    Specimen: Urine, Clean Catch Updated: 07/23/25 1454     RBC, UA 0-2 /HPF      WBC, UA 0-2 /HPF      Bacteria, UA Trace /HPF      Squamous Epithelial Cells, UA 3-6 /HPF      Hyaline Casts, UA 3-6 /LPF      Mucus, UA Moderate/2+ /HPF      Methodology Manual Light Microscopy    Urine Drug Screen - Urine, Clean Catch [[id number]]  (Abnormal) Collected: 07/23/25 1415    Specimen: Urine, Clean Catch Updated: 07/23/25 1512     THC, Screen,  Urine Positive     Phencyclidine (PCP), Urine Negative     Cocaine Screen, Urine Negative     Methamphetamine, Ur Negative     Opiate Screen Negative     Amphetamine Screen, Urine Negative     Benzodiazepine Screen, Urine Negative     Tricyclic Antidepressants Screen Negative     Methadone Screen, Urine Negative     Barbiturates Screen, Urine Negative     Oxycodone Screen, Urine Negative     Buprenorphine, Screen, Urine Negative    Narrative:      Cutoff For Drugs Screened:    Amphetamines               500 ng/ml  Barbiturates               200 ng/ml  Benzodiazepines            150 ng/ml  Cocaine                    150 ng/ml  Methadone                  200 ng/ml  Opiates                    100 ng/ml  Phencyclidine               25 ng/ml  THC                         50 ng/ml  Methamphetamine            500 ng/ml  Tricyclic Antidepressants  300 ng/ml  Oxycodone                  100 ng/ml  Buprenorphine               10 ng/ml    The normal value for all drugs tested is negative. This report includes unconfirmed screening results, with the cutoff values listed, to be used for medical treatment purposes only.  Unconfirmed results must not be used for non-medical purposes such as employment or legal testing.  Clinical consideration should be applied to any drug of abuse test, particularly when unconfirmed results are used.      Fentanyl, Urine - Urine, Clean Catch [[id number]]  (Normal) Collected: 07/23/25 1415    Specimen: Urine, Clean Catch Updated: 07/23/25 1512     Fentanyl, Urine Negative    Narrative:      Negative Threshold:      Fentanyl 5 ng/mL     The normal value for the drug tested is negative. This report includes final unconfirmed screening results to be used for medical treatment purposes only. Unconfirmed results must not be used for non-medical purposes such as employment or legal testing. Clinical consideration should be applied to any drug of abuse test, particularly when unconfirmed results are used.                     XR Chest 1 View  Result Date: 7/23/2025  XR CHEST 1 VW Date of Exam: 7/23/2025 4:11 PM EDT Indication: nv Comparison: 2/13/2025 Findings: Cardiomediastinal silhouette is unremarkable.  No airspace disease, pneumothorax, nor pleural effusion. No acute osseous abnormality identified.     Impression: Impression: No active disease. Electronically Signed: Adam Mcghee MD  7/23/2025 4:19 PM EDT  Workstation ID: [id number]    CT Abdomen Pelvis With Contrast  Result Date: 7/23/2025  CT ABDOMEN PELVIS W CONTRAST Date of Exam: 7/23/2025 2:21 PM EDT Indication: nvd. Comparison: CT abdomen pelvis 2/11/2025. Technique: Axial CT images were obtained of the abdomen and pelvis following the uneventful intravenous administration of 85 mL Isovue-300. Reconstructed coronal and sagittal images were also obtained. Automated exposure control and iterative construction methods were used. Findings: Visualized lung bases appear clear without focal airspace consolidation. No pleural or pericardial effusion. Normal morphology of the liver. No evidence of focal liver lesion. Gallbladder is unremarkable. No biliary ductal dilatation. No findings of acute pancreatitis. Spleen is normal in size. No adrenal nodule. Kidneys are symmetric in size and enhancement without hydronephrosis. No distinct nephrolithiasis. Urinary bladder is underdistended. Pelvic reproductive organs are within physiologic limits. Trace pelvic free fluid, within physiologic  limits. Normal appendix. No bowel obstruction. No free air. No pathologically enlarged lymph nodes. No abdominal aortic aneurysm. Major vascular appears patent. No body wall abnormality. No acute or suspicious osseous lesion.     Impression: Impression: No acute findings in the abdomen or pelvis. Electronically Signed: Ashu Uriostegui MD  7/23/2025 2:46 PM EDT  Workstation ID: [id number]         [facility]      DDX: includes but is not limited to: gastroenteritis, biliary dz, jacinto villarreal  tear    Pertinent features from physical exam: benign.    Initial diagnostic plan: labs, ct ap    Results from initial plan were reviewed and interpreted by me revealing patient has leukocytosis noted. She has ketonuria which is not unexpected given her sx. No acute infection or surgical process identified on CT.   UDS is positive for THC.      Patient reports improvement in sx. She is requesting to drink sprite. I have not witnessed any episodes of emesis/hematemesis while patient has been in department. Suspect her hematemesis was from esophageal micro tear. CXR does not show perforation    Diagnostic information from other sources: none    Interventions / Re-evaluation: zorfran, bentyl. fluids    Medications   Sodium Chloride (PF) 0.9 % 10 mL (has no administration in time range)   promethazine (PHENERGAN) suppository 12.5 mg (12.5 mg Rectal Not Given 7/23/25 1513)   sodium chloride 0.9 % bolus 500 mL (0 mL Intravenous Stopped 7/23/25 1414)   ondansetron (ZOFRAN) injection 4 mg (4 mg Intravenous Given 7/23/25 1322)   dicyclomine (BENTYL) injection 20 mg (20 mg Intramuscular Given 7/23/25 1322)   iopamidol (ISOVUE-300) 61 % injection 85 mL (85 mL Intravenous Given 7/23/25 1425)       Results/clinical rationale were discussed with patient    Consultations/Discussion of results with other physicians: none    Data interpreted: Nursing notes reviewed, vital signs reviewed.  Labs independently interpreted by me .  Imaging independently interpreted by me.  EKG independently interpreted by me.      Counseling: Discussed the results above with the patient regarding need for admission or discharge.  Patient understands and agrees plan of care.      -----  ED Disposition       ED Disposition   Discharge    Condition   Stable    Comment   --             Final diagnoses:   Nausea and vomiting, unspecified vomiting type      Your Follow-Up Providers       [facility] EMERGENCY DEPARTMENT [place].    Specialty:  Emergency Medicine  Follow up details: As needed, If symptoms worsen  [address]  [facility] [address] [phone]             Danny Rutledge, DO In 1 day.    Specialties: Family Medicine, Urgent Care, Emergency Medicine  [address] [place] [id number] [phone]                       Contact information for after-discharge care    Follow-up information has not been specified.                    Your medication list        START taking these medications        Instructions Last Dose Given Next Dose Due   promethazine 25 MG suppository  Commonly known as: PHENERGAN  Replaces: promethazine 25 MG tablet      Insert 0.5 suppositories into the rectum Every 6 (Six) Hours As Needed for Nausea or Vomiting for up to 2 days.              CONTINUE taking these medications        Instructions Last Dose Given Next Dose Due   albuterol sulfate  (90 Base) MCG/ACT inhaler  Commonly known as: PROVENTIL HFA;VENTOLIN HFA;PROAIR HFA      Inhale 2 puffs Every 6 (Six) Hours As Needed for Wheezing or Shortness of Air.       cimetidine 300 MG tablet  Commonly known as: TAGAMET      TAKE 1 TABLET BY MOUTH 4 TIMES A DAY AS NEEDED FOR ITCHING       colestipol 1 g tablet  Commonly known as: COLESTID      TAKE 1 TABLET BY MOUTH 2 TIMES A DAY       OLANZapine zydis 5 MG disintegrating tablet  Commonly known as: zyPREXA      Place 1 tablet on the tongue 2 (Two) Times a Day As Needed (Nausea/vomiting) for up to 5 days.       pantoprazole 40 MG EC tablet  Commonly known as: PROTONIX      Take 1 tablet by mouth Every Morning.       Vortioxetine HBr 10 MG tablet tablet  Commonly known as: Trintellix      Take 1 tablet by mouth Daily With Breakfast.              STOP taking these medications      promethazine 25 MG tablet  Commonly known as: PHENERGAN  Replaced by: promethazine 25 MG suppository                  Where to Get Your Medications        These medications were sent to [id number] - [place], [place] - [address] - [phone] PH - [phone] FX  [address], [facility] [address]      Phone: [phone]   promethazine 25 MG suppository

## 2025-07-23 NOTE — Clinical Note
[facility] EMERGENCY DEPARTMENT [place]  [address]  [facility] [address]  Phone: [phone]  Fax: [phone]    Gorge Canela was seen and treated in our emergency department on 7/23/2025.  She may return to work on 07/26/2025.         Thank you for choosing [facility].    Jorge A Flynn MD

## 2025-07-25 LAB
BACTERIA SPEC AEROBE CULT: NORMAL
QT INTERVAL: 402 MS
QTC INTERVAL: 436 MS